# Patient Record
Sex: FEMALE | Race: WHITE | NOT HISPANIC OR LATINO | Employment: FULL TIME | URBAN - METROPOLITAN AREA
[De-identification: names, ages, dates, MRNs, and addresses within clinical notes are randomized per-mention and may not be internally consistent; named-entity substitution may affect disease eponyms.]

---

## 2023-05-26 ENCOUNTER — HOSPITAL ENCOUNTER (OUTPATIENT)
Dept: DATA CONVERSION | Facility: HOSPITAL | Age: 38
End: 2023-05-26
Attending: STUDENT IN AN ORGANIZED HEALTH CARE EDUCATION/TRAINING PROGRAM | Admitting: STUDENT IN AN ORGANIZED HEALTH CARE EDUCATION/TRAINING PROGRAM
Payer: COMMERCIAL

## 2023-05-26 DIAGNOSIS — J34.89 OTHER SPECIFIED DISORDERS OF NOSE AND NASAL SINUSES: ICD-10-CM

## 2023-05-26 DIAGNOSIS — F41.9 ANXIETY DISORDER, UNSPECIFIED: ICD-10-CM

## 2023-05-26 DIAGNOSIS — J34.2 DEVIATED NASAL SEPTUM: ICD-10-CM

## 2023-05-26 DIAGNOSIS — J34.3 HYPERTROPHY OF NASAL TURBINATES: ICD-10-CM

## 2023-05-26 DIAGNOSIS — J45.909 UNSPECIFIED ASTHMA, UNCOMPLICATED (HHS-HCC): ICD-10-CM

## 2023-05-26 DIAGNOSIS — F32.A DEPRESSION, UNSPECIFIED: ICD-10-CM

## 2023-05-26 DIAGNOSIS — F12.10 CANNABIS ABUSE, UNCOMPLICATED: ICD-10-CM

## 2023-05-26 DIAGNOSIS — G43.909 MIGRAINE, UNSPECIFIED, NOT INTRACTABLE, WITHOUT STATUS MIGRAINOSUS: ICD-10-CM

## 2023-05-26 DIAGNOSIS — I10 ESSENTIAL (PRIMARY) HYPERTENSION: ICD-10-CM

## 2023-05-26 DIAGNOSIS — J32.4 CHRONIC PANSINUSITIS: ICD-10-CM

## 2023-09-07 VITALS — HEIGHT: 64 IN | WEIGHT: 184.53 LBS | BODY MASS INDEX: 31.5 KG/M2

## 2023-09-30 NOTE — H&P
History & Physical Reviewed:   Pregnant/Lactating:  ·  Are You Pregnant no   ·  Are You Currently Breastfeeding no     I have reviewed the History and Physical dated:  23-May-2023   History and Physical reviewed and relevant findings noted. Patient examined to review pertinent physical  findings.: No significant changes   Home Medications Reviewed: no changes noted   Allergies Reviewed: no changes noted       ERAS (Enhanced Recovery After Surgery):  ·  ERAS Patient: no     Consent:   COVID-19 Consent:  ·  COVID-19 Risk Consent Surgeon has reviewed key risks related to the risk of nella COVID-19 and if they contract COVID-19 what the risks are.       Electronic Signatures:  Lukas Mcdowell)  (Signed 26-May-2023 07:29)   Authored: History & Physical Reviewed, ERAS, Consent,  Note Completion      Last Updated: 26-May-2023 07:29 by Lukas Mcdowell)

## 2023-10-02 NOTE — OP NOTE
Post Operative Note:     PreOp Diagnosis: nasal obstruction, nasal septal  deviation, inferior turbinate hypertrophy   Post-Procedure Diagnosis: same   Procedure: 1. nasal endoscopy  2. septoplasty  3. inferior turbinate reductions   Surgeon: Jeremias   Resident/Fellow/Other Assistant: none   Anesthesia: general   Estimated Blood Loss (mL): 25   Specimen: yes   Complications: none   Findings: see op note below   Patient Returned To/Condition: PACU/stable     Operative Report Dictated:  Dictation: not applicable - note contains Operative  Report   Operative Report:    Indications:   37-year-old female with longstanding issues with nasal airway obstruction and clinical findings of nasal septal deviation and inferior turbinate hypertrophy.  Patient has a history of aspirin intolerance and asthma but on imaging did not have significant  chronic sinusitis.  On initial nasal endoscopy there was generalized mucosal edema but no significant nasal drainage.  The patient was offered septoplasty and turbinate reduction with consideration of limited sinus surgery dependent on repeat nasal endoscopy  under anesthesia.  The risk, benefits, and alternatives were discussed in the office; see outpatient notes for further details.    Findings:  1.  On nasal endoscopy there is dry nasal mucosa anteriorly.  There is right nasal septal deviation with prominent spurring obstructing the middle meatus.  There is bilateral moderate inferior turbinate hypertrophy.  I do not appreciate significant drainage  from the left middle meatus nor patent posterior fontanelle.  No significant drainage was appreciated at the right middle meatus nor the bilateral sphenoid ethmoid recesses.  The nasopharynx was clear.  2.  Interestingly, part of the septal cartilage anteriorly was missing or had eroded.  No perforation was appreciated.  3.  Silicone stents were placed along the septum bilaterally and secured with a stitch.    Procedure:  The patient was  seen in the preoperative area where consent was confirmed and all questions were answered. The patient was brought back to the operating room where a full team timeout was performed. The patient was then induced and intubated by the anesthesia  team. The table was turned 90 degrees. [place] oxymetazoline was applied to both nasal cavities. The patient was placed in slight reverse Trendelenburg position.    The patient was prepped and draped in a sterile fashion and a second confirmatory timeout was performed.    We first evaluated the nasal cavities with a 0 degree endoscope bilaterally; the above findings were noted. We injected the septum and heads of the inferior turbinates with local 1% lidocaine with 1:100,000 epinephrine.    We continued with the septoplasty. After an appropriate amount of time a right-sided saroj-transfixion incision was incised using the 15-blade. The plane was first established with the Garrick elevator and a right-sided mucoperichondrial flap was elevated  posteriorly to the level of the choana. We planned out an L-strut taking care to ensure that at least 15 mm of cartilage was left anterior and superiorly for structural stability.  Interestingly, the patient did have a small area of eroded cartilage anterior  to our planned incision.  No perforation was present.  We used the Davison elevator to make our cartilage incisions. We traversed the cartilage and used the Garrick elevator to develop the contralateral mucoperichondrial flap. The quadrangular cartilage  was  from the bony septum and removed and saved for possible later implantation. The double-action punch was used to make our superior bony cut and the deviated bone was removed with the straight forceps. The flap was elevated posteriorly to  the left choana. All deviated bone was removed.    The mucoperichondrial pocket was irrigated to ensure that all cartilage and bone was removed and that hemostasis was achieved.  The  quadrangular cartilage was reshaped and crushed and then replaced between the mucoperichondrial pocket; this was fixed  with a quilting 4-0 Chromic Gut suture.  We closed the saroj-transfixion incision with 4-0 chromic gut suture.    We next proceeded with submucous resection of the inferior turbinates. Stab incisions were made bilaterally with a 15-blade. A medial pocket over the inferior turbinates was created with the Garrick elevator. The inferior turbinate blade on the microdebrider  was used to reduce the turbinates bilaterally.    Finally, silicone splints were placed and secured with a 3-0 prolene suture. An orogastric tube was passed into the stomach and the gastric contents were evacuated.    All instrumentation was then removed from the patient who was then turned back and returned to the care of the anesthesia team for emergence and extubation. The patient was transported to the recovery area. There were no immediate complications appreciated.    This note was created using speech recognition transcription software. Despite proofreading, typographical errors may be present that affect the meaning of the content. Please contact my office with any questions.        Attestation:   Note Completion:  Attending Attestation I performed the procedure without a resident         Electronic Signatures:  Lukas Mcdowell)  (Signed 26-May-2023 09:14)   Authored: Post Operative Note, Note Completion      Last Updated: 26-May-2023 09:14 by Lukas Mcdowell)

## 2023-11-10 RX ORDER — LISINOPRIL AND HYDROCHLOROTHIAZIDE 10; 12.5 MG/1; MG/1
TABLET ORAL
COMMUNITY
Start: 2016-05-24 | End: 2023-11-15 | Stop reason: SDUPTHER

## 2023-11-15 DIAGNOSIS — J45.909 ASTHMA, UNSPECIFIED ASTHMA SEVERITY, UNSPECIFIED WHETHER COMPLICATED, UNSPECIFIED WHETHER PERSISTENT (HHS-HCC): Primary | ICD-10-CM

## 2023-11-15 DIAGNOSIS — I10 ESSENTIAL (PRIMARY) HYPERTENSION: Primary | ICD-10-CM

## 2023-11-15 RX ORDER — LISINOPRIL AND HYDROCHLOROTHIAZIDE 10; 12.5 MG/1; MG/1
1 TABLET ORAL DAILY
Qty: 100 TABLET | Refills: 1 | Status: SHIPPED | OUTPATIENT
Start: 2023-11-15 | End: 2023-12-20 | Stop reason: SDUPTHER

## 2023-11-15 RX ORDER — METHYLPHENIDATE HYDROCHLORIDE 20 MG/1
TABLET ORAL
COMMUNITY
End: 2023-12-20 | Stop reason: WASHOUT

## 2023-11-15 RX ORDER — LOTEPREDNOL ETABONATE 5 MG/ML
SUSPENSION/ DROPS OPHTHALMIC
COMMUNITY
Start: 2023-10-28 | End: 2023-12-20 | Stop reason: WASHOUT

## 2023-11-15 RX ORDER — LISDEXAMFETAMINE DIMESYLATE 30 MG/1
CAPSULE ORAL
COMMUNITY
Start: 2023-08-03

## 2023-11-15 RX ORDER — ALBUTEROL SULFATE 1.25 MG/3ML
1.25 SOLUTION RESPIRATORY (INHALATION) EVERY 4 HOURS PRN
COMMUNITY
End: 2023-11-15 | Stop reason: SDUPTHER

## 2023-11-15 RX ORDER — OMALIZUMAB 150 MG/ML
INJECTION, SOLUTION SUBCUTANEOUS
COMMUNITY
Start: 2022-02-14

## 2023-11-15 RX ORDER — IPRATROPIUM BROMIDE 21 UG/1
SPRAY, METERED NASAL
COMMUNITY
Start: 2023-02-08 | End: 2023-12-20 | Stop reason: WASHOUT

## 2023-11-15 RX ORDER — OMALIZUMAB 75 MG/.5ML
INJECTION, SOLUTION SUBCUTANEOUS
COMMUNITY
Start: 2022-02-14

## 2023-11-15 RX ORDER — TOPIRAMATE 200 MG/1
TABLET ORAL
COMMUNITY

## 2023-11-15 RX ORDER — FLUTICASONE PROPIONATE AND SALMETEROL 50; 250 UG/1; UG/1
POWDER RESPIRATORY (INHALATION)
COMMUNITY
Start: 2022-03-31

## 2023-11-15 RX ORDER — PAROXETINE HYDROCHLORIDE HEMIHYDRATE 37.5 MG/1
TABLET, FILM COATED, EXTENDED RELEASE ORAL
COMMUNITY

## 2023-11-15 RX ORDER — ARIPIPRAZOLE 15 MG/1
TABLET ORAL
COMMUNITY
Start: 2023-10-15

## 2023-11-15 NOTE — TELEPHONE ENCOUNTER
Patient called today stating that she has been out of her Lisinopril for two weeks she stated that her BP has been in the 200's

## 2023-11-16 RX ORDER — ALBUTEROL SULFATE 1.25 MG/3ML
1.25 SOLUTION RESPIRATORY (INHALATION) EVERY 4 HOURS PRN
Qty: 75 ML | Refills: 1 | Status: SHIPPED | OUTPATIENT
Start: 2023-11-16

## 2023-11-16 RX ORDER — LISINOPRIL AND HYDROCHLOROTHIAZIDE 10; 12.5 MG/1; MG/1
TABLET ORAL
OUTPATIENT
Start: 2023-11-16

## 2023-12-20 ENCOUNTER — LAB (OUTPATIENT)
Dept: LAB | Facility: LAB | Age: 38
End: 2023-12-20
Payer: COMMERCIAL

## 2023-12-20 ENCOUNTER — OFFICE VISIT (OUTPATIENT)
Dept: PRIMARY CARE | Facility: CLINIC | Age: 38
End: 2023-12-20
Payer: COMMERCIAL

## 2023-12-20 ENCOUNTER — HOSPITAL ENCOUNTER (OUTPATIENT)
Dept: RADIOLOGY | Facility: EXTERNAL LOCATION | Age: 38
Discharge: HOME | End: 2023-12-20

## 2023-12-20 VITALS
BODY MASS INDEX: 31.58 KG/M2 | TEMPERATURE: 97.8 F | WEIGHT: 184 LBS | HEART RATE: 100 BPM | SYSTOLIC BLOOD PRESSURE: 118 MMHG | OXYGEN SATURATION: 98 % | DIASTOLIC BLOOD PRESSURE: 85 MMHG

## 2023-12-20 DIAGNOSIS — Z00.00 ANNUAL PHYSICAL EXAM: Primary | ICD-10-CM

## 2023-12-20 DIAGNOSIS — F33.1 MODERATE RECURRENT MAJOR DEPRESSION (MULTI): ICD-10-CM

## 2023-12-20 DIAGNOSIS — F90.2 ATTENTION DEFICIT HYPERACTIVITY DISORDER (ADHD), COMBINED TYPE: ICD-10-CM

## 2023-12-20 DIAGNOSIS — I10 ESSENTIAL (PRIMARY) HYPERTENSION: ICD-10-CM

## 2023-12-20 DIAGNOSIS — G47.33 OSA (OBSTRUCTIVE SLEEP APNEA): ICD-10-CM

## 2023-12-20 DIAGNOSIS — R73.9 HYPERGLYCEMIA: ICD-10-CM

## 2023-12-20 DIAGNOSIS — G47.19 EXCESSIVE DAYTIME SLEEPINESS: ICD-10-CM

## 2023-12-20 DIAGNOSIS — R53.83 OTHER FATIGUE: ICD-10-CM

## 2023-12-20 DIAGNOSIS — Z12.31 ENCOUNTER FOR SCREENING MAMMOGRAM FOR MALIGNANT NEOPLASM OF BREAST: ICD-10-CM

## 2023-12-20 DIAGNOSIS — E55.9 VITAMIN D DEFICIENCY: ICD-10-CM

## 2023-12-20 DIAGNOSIS — F41.9 ANXIETY: ICD-10-CM

## 2023-12-20 DIAGNOSIS — Z00.00 ANNUAL PHYSICAL EXAM: ICD-10-CM

## 2023-12-20 DIAGNOSIS — E78.2 MIXED HYPERLIPIDEMIA: ICD-10-CM

## 2023-12-20 PROBLEM — L30.9 ECZEMA: Status: ACTIVE | Noted: 2023-12-20

## 2023-12-20 PROBLEM — B97.7 HPV (HUMAN PAPILLOMA VIRUS) INFECTION: Status: ACTIVE | Noted: 2023-12-20

## 2023-12-20 PROBLEM — L70.0 ACNE VULGARIS: Status: ACTIVE | Noted: 2019-08-19

## 2023-12-20 PROBLEM — Z91.018 ALLERGY TO NUTS: Status: ACTIVE | Noted: 2022-03-21

## 2023-12-20 PROBLEM — J30.1 ALLERGIC RHINITIS DUE TO POLLEN: Status: ACTIVE | Noted: 2023-12-20

## 2023-12-20 PROBLEM — E78.5 HYPERLIPIDEMIA: Status: ACTIVE | Noted: 2023-12-20

## 2023-12-20 PROBLEM — Z15.89 HLA B27 POSITIVE: Status: ACTIVE | Noted: 2023-12-20

## 2023-12-20 PROBLEM — E66.01 MORBID OBESITY DUE TO EXCESS CALORIES (MULTI): Status: ACTIVE | Noted: 2023-12-20

## 2023-12-20 PROBLEM — E66.09 CLASS 1 OBESITY DUE TO EXCESS CALORIES WITH SERIOUS COMORBIDITY AND BODY MASS INDEX (BMI) OF 31.0 TO 31.9 IN ADULT: Status: ACTIVE | Noted: 2023-12-20

## 2023-12-20 PROBLEM — F41.1 ANXIETY STATE: Status: ACTIVE | Noted: 2022-03-21

## 2023-12-20 PROBLEM — J34.2 DEVIATED NASAL SEPTUM: Status: ACTIVE | Noted: 2023-12-20

## 2023-12-20 PROBLEM — E66.811 CLASS 1 OBESITY DUE TO EXCESS CALORIES WITH SERIOUS COMORBIDITY AND BODY MASS INDEX (BMI) OF 31.0 TO 31.9 IN ADULT: Status: ACTIVE | Noted: 2023-12-20

## 2023-12-20 PROBLEM — Z15.89 MTHFR MUTATION: Status: ACTIVE | Noted: 2023-12-20

## 2023-12-20 LAB
25(OH)D3 SERPL-MCNC: 27 NG/ML (ref 30–100)
ALBUMIN SERPL BCP-MCNC: 4.5 G/DL (ref 3.4–5)
ALP SERPL-CCNC: 43 U/L (ref 33–110)
ALT SERPL W P-5'-P-CCNC: 17 U/L (ref 7–45)
ANION GAP SERPL CALC-SCNC: 11 MMOL/L (ref 10–20)
APPEARANCE UR: CLEAR
AST SERPL W P-5'-P-CCNC: 16 U/L (ref 9–39)
BASOPHILS # BLD AUTO: 0.09 X10*3/UL (ref 0–0.1)
BASOPHILS NFR BLD AUTO: 1.2 %
BILIRUB SERPL-MCNC: 0.5 MG/DL (ref 0–1.2)
BILIRUB UR STRIP.AUTO-MCNC: NEGATIVE MG/DL
BUN SERPL-MCNC: 11 MG/DL (ref 6–23)
CALCIUM SERPL-MCNC: 9.5 MG/DL (ref 8.6–10.3)
CHLORIDE SERPL-SCNC: 106 MMOL/L (ref 98–107)
CHOLEST SERPL-MCNC: 254 MG/DL (ref 0–199)
CHOLESTEROL/HDL RATIO: 6
CO2 SERPL-SCNC: 24 MMOL/L (ref 21–32)
COLOR UR: YELLOW
CREAT SERPL-MCNC: 0.84 MG/DL (ref 0.5–1.05)
EOSINOPHIL # BLD AUTO: 0.65 X10*3/UL (ref 0–0.7)
EOSINOPHIL NFR BLD AUTO: 8.6 %
ERYTHROCYTE [DISTWIDTH] IN BLOOD BY AUTOMATED COUNT: 11.7 % (ref 11.5–14.5)
GFR SERPL CREATININE-BSD FRML MDRD: 90 ML/MIN/1.73M*2
GLUCOSE SERPL-MCNC: 80 MG/DL (ref 74–99)
GLUCOSE UR STRIP.AUTO-MCNC: NEGATIVE MG/DL
HCT VFR BLD AUTO: 39.3 % (ref 36–46)
HDLC SERPL-MCNC: 42.2 MG/DL
HGB BLD-MCNC: 12.9 G/DL (ref 12–16)
IMM GRANULOCYTES # BLD AUTO: 0.02 X10*3/UL (ref 0–0.7)
IMM GRANULOCYTES NFR BLD AUTO: 0.3 % (ref 0–0.9)
KETONES UR STRIP.AUTO-MCNC: NEGATIVE MG/DL
LDLC SERPL CALC-MCNC: 175 MG/DL
LEUKOCYTE ESTERASE UR QL STRIP.AUTO: NEGATIVE
LYMPHOCYTES # BLD AUTO: 2.25 X10*3/UL (ref 1.2–4.8)
LYMPHOCYTES NFR BLD AUTO: 29.6 %
MCH RBC QN AUTO: 29.5 PG (ref 26–34)
MCHC RBC AUTO-ENTMCNC: 32.8 G/DL (ref 32–36)
MCV RBC AUTO: 90 FL (ref 80–100)
MONOCYTES # BLD AUTO: 0.58 X10*3/UL (ref 0.1–1)
MONOCYTES NFR BLD AUTO: 7.6 %
NEUTROPHILS # BLD AUTO: 4.01 X10*3/UL (ref 1.2–7.7)
NEUTROPHILS NFR BLD AUTO: 52.7 %
NITRITE UR QL STRIP.AUTO: NEGATIVE
NON HDL CHOLESTEROL: 212 MG/DL (ref 0–149)
NRBC BLD-RTO: 0 /100 WBCS (ref 0–0)
PH UR STRIP.AUTO: 7 [PH]
PLATELET # BLD AUTO: 361 X10*3/UL (ref 150–450)
POTASSIUM SERPL-SCNC: 3.7 MMOL/L (ref 3.5–5.3)
PROT SERPL-MCNC: 6.9 G/DL (ref 6.4–8.2)
PROT UR STRIP.AUTO-MCNC: NEGATIVE MG/DL
RBC # BLD AUTO: 4.38 X10*6/UL (ref 4–5.2)
RBC # UR STRIP.AUTO: NEGATIVE /UL
SODIUM SERPL-SCNC: 137 MMOL/L (ref 136–145)
SP GR UR STRIP.AUTO: 1.01
TRIGL SERPL-MCNC: 182 MG/DL (ref 0–149)
TSH SERPL-ACNC: 1.43 MIU/L (ref 0.44–3.98)
UROBILINOGEN UR STRIP.AUTO-MCNC: <2 MG/DL
VLDL: 36 MG/DL (ref 0–40)
WBC # BLD AUTO: 7.6 X10*3/UL (ref 4.4–11.3)

## 2023-12-20 PROCEDURE — 99214 OFFICE O/P EST MOD 30 MIN: CPT | Performed by: INTERNAL MEDICINE

## 2023-12-20 PROCEDURE — 3074F SYST BP LT 130 MM HG: CPT | Performed by: INTERNAL MEDICINE

## 2023-12-20 PROCEDURE — 99396 PREV VISIT EST AGE 40-64: CPT | Performed by: INTERNAL MEDICINE

## 2023-12-20 PROCEDURE — 80053 COMPREHEN METABOLIC PANEL: CPT

## 2023-12-20 PROCEDURE — 84443 ASSAY THYROID STIM HORMONE: CPT

## 2023-12-20 PROCEDURE — 83036 HEMOGLOBIN GLYCOSYLATED A1C: CPT

## 2023-12-20 PROCEDURE — 80061 LIPID PANEL: CPT

## 2023-12-20 PROCEDURE — 85025 COMPLETE CBC W/AUTO DIFF WBC: CPT

## 2023-12-20 PROCEDURE — 82306 VITAMIN D 25 HYDROXY: CPT

## 2023-12-20 PROCEDURE — 3008F BODY MASS INDEX DOCD: CPT | Performed by: INTERNAL MEDICINE

## 2023-12-20 PROCEDURE — 1036F TOBACCO NON-USER: CPT | Performed by: INTERNAL MEDICINE

## 2023-12-20 PROCEDURE — 36415 COLL VENOUS BLD VENIPUNCTURE: CPT

## 2023-12-20 PROCEDURE — 3079F DIAST BP 80-89 MM HG: CPT | Performed by: INTERNAL MEDICINE

## 2023-12-20 PROCEDURE — 81003 URINALYSIS AUTO W/O SCOPE: CPT

## 2023-12-20 RX ORDER — LISINOPRIL 10 MG/1
10 TABLET ORAL DAILY
COMMUNITY
End: 2023-12-20 | Stop reason: WASHOUT

## 2023-12-20 RX ORDER — LISINOPRIL AND HYDROCHLOROTHIAZIDE 10; 12.5 MG/1; MG/1
1 TABLET ORAL DAILY
Qty: 100 TABLET | Refills: 1 | Status: SHIPPED | OUTPATIENT
Start: 2023-12-20 | End: 2024-07-07

## 2023-12-20 RX ORDER — FAMOTIDINE 20 MG/1
20 TABLET, FILM COATED ORAL DAILY
COMMUNITY

## 2023-12-20 RX ORDER — FEXOFENADINE HCL 60 MG
60 TABLET ORAL DAILY
COMMUNITY

## 2023-12-20 RX ORDER — BENZOCAINE .13; .15; .5; 2 G/100G; G/100G; G/100G; G/100G
2 GEL ORAL DAILY
COMMUNITY
Start: 2014-09-24

## 2023-12-20 NOTE — PROGRESS NOTES
Primary Care Physician: Jens Mendez MD    Date of Visit: 12/20/2023     Chief Complaint:   Chief Complaint   Patient presents with    Follow-up     6 mo        Subjective:  Eloy Taylor is a 38 y.o. female presents annual physical      Past Medical History:  Past Medical History:   Diagnosis Date    Encounter for fertility testing 01/14/2018    Fertility testing    Encounter for other general counseling and advice on procreation 10/26/2017    Encounter for preconception consultation    Headache, unspecified     Bad headache    Maternal care for (suspected) hereditary disease in fetus, not applicable or unspecified 10/26/2017    Hereditary disease in family possibly affecting fetus    Other allergy status, other than to drugs and biological substances     History of environmental allergies    Other specified health status 08/31/2018    Contraception    Other specified health status 08/31/2018    Contraception    Other specified postprocedural states 01/19/2018    History of in vitro fertilization    Pain in unspecified knee 02/21/2019    Chronic knee pain    Pain in unspecified shoulder 12/22/2016    Acromioclavicular pain    Personal history of other diseases of the circulatory system 12/28/2015    History of essential hypertension    Personal history of other diseases of the circulatory system     History of hypertension    Personal history of other diseases of the nervous system and sense organs 05/16/2017    History of migraine    Personal history of other diseases of the respiratory system     History of asthma    Personal history of other mental and behavioral disorders 10/25/2017    History of anxiety disorder    Recurrent pregnancy loss 10/09/2017    Recurrent pregnancy loss without current pregnancy        Social History:   reports that she has never smoked. She has never used smokeless tobacco. She reports that she does not currently use drugs.     Surgical History:  Past Surgical History:   Procedure  "Laterality Date    DILATION AND CURETTAGE OF UTERUS  06/29/2015    Dilation And Curettage    MOUTH SURGERY  12/07/2015    Oral Surgery Tooth Extraction    OTHER SURGICAL HISTORY  01/04/2023    Elbow surgery        Family History:  family history is not on file.      Allergies:  Allergies   Allergen Reactions    Aspirin Shortness of breath     Worsens her asthma    Doxylamin-Pse-Dm-Acetaminophen Anaphylaxis    Latex Itching    Nut - Unspecified Unknown     tree nuts    Animal Dander Unknown    Azithromycin Nausea/vomiting    Codeine Nausea And Vomiting and Nausea/vomiting    Hydrocodone-Acetaminophen GI Upset    Milk Rash    Sertraline Itching and Rash    Sumatriptan Unknown     Other reaction(s): Other: See Comments   Pt gets all side effect listed with medication    Pt gets all side effect listed with medication    Tramadol Dizziness     Other reaction(s): Other: See Comments   \"Drunk\" feeling    \"Drunk\" feeling       Outpatient Medications:  Current Outpatient Medications   Medication Instructions    Advair Diskus 250-50 mcg/dose diskus inhaler     albuterol 1.25 mg, nebulization, Every 4 hours PRN    ARIPiprazole (Abilify) 15 mg tablet     budesonide (Rhinocort Allergy) 32 mcg/actuation nasal spray 2 sprays, nasal, Daily    famotidine (PEPCID) 20 mg, oral, Daily    fexofenadine (ALLEGRA ALLERGY) 60 mg, oral, Daily    lisinopriL-hydrochlorothiazide 10-12.5 mg tablet 1 tablet, oral, Daily    PARoxetine CR (Paxil-CR) 37.5 mg 24 hr tablet oral    topiramate (Topamax) 200 mg tablet oral    Vyvanse 30 mg capsule     Xolair 150 mg/mL injection     Xolair 75 mg/0.5 mL injection        HPI:  HPI  Hx of htn--taking medication daily.   Hx of adhd/depression and anxiety==followed by psyche.    Anxiety.  States that therapist thinks that she may be on the autism spectrum.   Hx of rey.  Had sleep study, but has not been able to sched cpap titration. Will need a new order.   ROS:   Review of Systems   Constitutional:  Negative " for activity change, chills, fatigue, fever and unexpected weight change.   HENT:  Negative for congestion, dental problem, ear pain, sinus pressure, sinus pain, sore throat and trouble swallowing.    Eyes:  Negative for photophobia, discharge, redness and visual disturbance.   Respiratory:  Negative for cough, chest tightness, shortness of breath and wheezing.    Cardiovascular:  Negative for chest pain, palpitations and leg swelling.   Gastrointestinal:  Negative for abdominal pain, blood in stool, constipation, diarrhea, nausea and vomiting.   Endocrine: Negative for cold intolerance, heat intolerance, polydipsia, polyphagia and polyuria.   Genitourinary:  Negative for difficulty urinating, dysuria, flank pain, frequency and urgency.   Musculoskeletal:  Negative for arthralgias, joint swelling and myalgias.   Skin:  Negative for color change and rash.   Allergic/Immunologic: Negative for environmental allergies, food allergies and immunocompromised state.   Neurological:  Negative for dizziness, weakness, light-headedness, numbness and headaches.   Hematological:  Does not bruise/bleed easily.   Psychiatric/Behavioral:  Positive for sleep disturbance. Negative for dysphoric mood. The patient is not nervous/anxious.         No anxiety.  No depression  +rey        Vitals:  /85   Pulse 100   Temp 36.6 °C (97.8 °F)   Wt 83.5 kg (184 lb)   SpO2 98%   BMI 31.58 kg/m²   Wt Readings from Last 2 Encounters:   12/20/23 83.5 kg (184 lb)   06/01/23 83.1 kg (183 lb 2 oz)       Physical Exam:  Physical Exam  Constitutional:       General: She is not in acute distress.     Appearance: Normal appearance. She is well-developed and well-groomed.   HENT:      Head: Normocephalic and atraumatic.      Right Ear: Tympanic membrane and ear canal normal.      Left Ear: Tympanic membrane and ear canal normal.      Nose: Nose normal.      Mouth/Throat:      Mouth: Mucous membranes are moist.      Pharynx: Oropharynx is clear.    Eyes:      Conjunctiva/sclera: Conjunctivae normal.      Pupils: Pupils are equal, round, and reactive to light.   Neck:      Thyroid: No thyromegaly.   Cardiovascular:      Rate and Rhythm: Normal rate and regular rhythm.      Heart sounds: Normal heart sounds, S1 normal and S2 normal. No murmur heard.  Pulmonary:      Effort: Pulmonary effort is normal.      Breath sounds: Normal breath sounds and air entry. No wheezing, rhonchi or rales.   Abdominal:      General: Bowel sounds are normal. There is no distension.      Palpations: Abdomen is soft.      Tenderness: There is no abdominal tenderness. There is no guarding or rebound.   Musculoskeletal:         General: No swelling or tenderness. Normal range of motion.      Cervical back: Normal, full passive range of motion without pain, normal range of motion and neck supple.      Thoracic back: Normal.      Lumbar back: Normal.      Right lower leg: No edema.      Left lower leg: No edema.      Comments: Upper and lower extrems are wnl--inspection and palpation.   Strength is normal and symmetric.   Lymphadenopathy:      Cervical: No cervical adenopathy.   Skin:     General: Skin is warm and dry.      Findings: No bruising, erythema, lesion or rash.      Comments: Intact   Neurological:      General: No focal deficit present.      Mental Status: She is alert and oriented to person, place, and time.      Deep Tendon Reflexes: Reflexes are normal and symmetric.   Psychiatric:         Mood and Affect: Mood and affect normal.        Assessment/Plan   Diagnoses and all orders for this visit:  Annual physical exam  -     CBC and Auto Differential; Future  -     Comprehensive Metabolic Panel; Future  -     Lipid Panel; Future  -     Urinalysis with Reflex Microscopic  -     Vitamin D 25-Hydroxy,Total (for eval of Vitamin D levels); Future  -     TSH with reflex to Free T4 if abnormal; Future  Essential (primary) hypertension  -     lisinopriL-hydrochlorothiazide 10-12.5  mg tablet; Take 1 tablet by mouth once daily.  NICOLE (obstructive sleep apnea)  -     In-Center Sleep Study; Future  Attention deficit hyperactivity disorder (ADHD), combined type  Vitamin D deficiency  Mixed hyperlipidemia  Anxiety  Moderate recurrent major depression (CMS/HCC)  Encounter for screening mammogram for malignant neoplasm of breast  -     BI mammo bilateral screening tomosynthesis; Future  Hyperglycemia  -     Hemoglobin A1C; Future  Other fatigue  Excessive daytime sleepiness          Orders:  Orders Placed This Encounter   Procedures    BI mammo bilateral screening tomosynthesis    CBC and Auto Differential    Comprehensive Metabolic Panel    Lipid Panel    Urinalysis with Reflex Microscopic    Vitamin D 25-Hydroxy,Total (for eval of Vitamin D levels)    TSH with reflex to Free T4 if abnormal    Hemoglobin A1C    In-Center Sleep Study      Followup Appts:  No future appointments.        ____________________________________________________________  Jens Mendez MD

## 2023-12-21 LAB
EST. AVERAGE GLUCOSE BLD GHB EST-MCNC: 111 MG/DL
HBA1C MFR BLD: 5.5 %

## 2023-12-27 ENCOUNTER — TELEPHONE (OUTPATIENT)
Dept: PRIMARY CARE | Facility: CLINIC | Age: 38
End: 2023-12-27
Payer: COMMERCIAL

## 2023-12-27 NOTE — TELEPHONE ENCOUNTER
[facility] SLEEP LAB CALLING NEEDING YOU TO ADDEND YOUR OFFICE VISIT NOTE IT DOES NOT STATE WHY PATIENT NEEDS SLEEP STUDY

## 2023-12-29 ASSESSMENT — ENCOUNTER SYMPTOMS
NAUSEA: 0
SINUS PRESSURE: 0
VOMITING: 0
ACTIVITY CHANGE: 0
BRUISES/BLEEDS EASILY: 0
BLOOD IN STOOL: 0
DYSPHORIC MOOD: 0
SORE THROAT: 0
PALPITATIONS: 0
FATIGUE: 0
EYE REDNESS: 0
POLYPHAGIA: 0
DIZZINESS: 0
SLEEP DISTURBANCE: 1
WEAKNESS: 0
JOINT SWELLING: 0
MYALGIAS: 0
DIFFICULTY URINATING: 0
LIGHT-HEADEDNESS: 0
UNEXPECTED WEIGHT CHANGE: 0
COLOR CHANGE: 0
POLYDIPSIA: 0
HEADACHES: 0
SINUS PAIN: 0
WHEEZING: 0
DIARRHEA: 0
CONSTIPATION: 0
FEVER: 0
FLANK PAIN: 0
EYE DISCHARGE: 0
ABDOMINAL PAIN: 0
CHILLS: 0
NERVOUS/ANXIOUS: 0
CHEST TIGHTNESS: 0
TROUBLE SWALLOWING: 0
COUGH: 0
FREQUENCY: 0
ARTHRALGIAS: 0
PHOTOPHOBIA: 0
NUMBNESS: 0
SHORTNESS OF BREATH: 0
DYSURIA: 0

## 2024-06-25 PROBLEM — I10 BENIGN ESSENTIAL HYPERTENSION: Status: RESOLVED | Noted: 2022-03-21 | Resolved: 2024-06-25

## 2024-06-25 PROBLEM — E78.5 HYPERLIPIDEMIA: Status: RESOLVED | Noted: 2023-12-20 | Resolved: 2024-06-25

## 2024-06-25 PROBLEM — J45.909 ASTHMA (HHS-HCC): Status: ACTIVE | Noted: 2024-06-25

## 2024-06-25 NOTE — PATIENT INSTRUCTIONS
Continue Xolair 375 mg through [facility] until Dupixent is started here in the office.  This will be delivered to your home from a specialty pharmacy.  We will contact you to confirm delivery once we obtain authorization.  If you do not hear from them in the next two weeks, please contact our office.      Continue Advair.     Continue albuterol as needed.    Follow up in 5 months or sooner if problems or symptoms worsen prior.

## 2024-06-25 NOTE — PROGRESS NOTES
Subjective   Patient ID:   [id number]   Eloy Taylor is a 38 y.o. female who presents for Allergies and Asthma (ACT 25/XOLAIR 375 EVERY 3 WEEKS (spread out) interested in switching to Dupixent to help with eczema).    Chief Complaint   Patient presents with    Allergies    Asthma     ACT 25  XOLAIR 375 EVERY 3 WEEKS (spread out) interested in switching to Dupixent to help with eczema     Since last visit, 8-3-23, patient reports she receives Xolair every 3 weeks for her asthma and her asthma is under excellent control.     Nonetheless, she is currently seeing dermatology and is requiring but she is C/O eczema flaring on her arms, face and scalp despite using halobetasol, tacrolimus and Kenalog cream.  In addition, she is developing acne due to the creams. She saw [facility] and was recommended switching to Dupixent.      She continues Advair once a day.     Objective     /62 (BP Location: Left arm, Patient Position: Sitting, BP Cuff Size: Adult)   Pulse 94   SpO2 100%      Physical Exam  Constitutional:       Appearance: Normal appearance.   HENT:      Head: Normocephalic and atraumatic.      Right Ear: External ear normal. There is no impacted cerumen.      Left Ear: External ear normal. There is no impacted cerumen.      Nose: No congestion or rhinorrhea.   Eyes:      Extraocular Movements: Extraocular movements intact.      Conjunctiva/sclera: Conjunctivae normal.      Pupils: Pupils are equal, round, and reactive to light.   Cardiovascular:      Rate and Rhythm: Normal rate and regular rhythm.      Heart sounds: No murmur heard.     No friction rub. No gallop.   Pulmonary:      Effort: No respiratory distress.      Breath sounds: No wheezing, rhonchi or rales.   Skin:     General: Skin is warm and dry.      Findings: Rash (dermatitis on her face) present.   Neurological:      Mental Status: She is alert.   Psychiatric:         Mood and Affect: Mood normal.         Behavior: Behavior normal.        Assessment/Plan     Asthma ([facility])  ACT is 25.    She is doing very well with Xolair, but now her medical history is complicated by dermatitis. Her last eosinophils were measured at over than 600. I would like to change her Xolair to Dupixent. Hopefully she will have as good of a response to the Dupixent    Atopic dermatitis  I would like her to start Dupixent. She has failed multiple medications and with her co-existing asthma Dupixent is a better option for her than Xolair.     Approximately 24% of her body is affected. But most importantly is that her facial dermatitis is not controlled despite medications and she is having side effects from the medications          By signing my name below, I, Corie Bunch, attest that this documentation has been prepared under the direction and in the presence of Mayda Beard MD.  All medical record entries made by the Scribe were at my direction and personally dictated by me. I have reviewed the chart and agree that the record accurately reflects my personal performance of the history, physical exam, discussion and plan.

## 2024-06-25 NOTE — ASSESSMENT & PLAN NOTE
ACT is 25.    She is doing very well with Xolair, but now her medical history is complicated by dermatitis. Her last eosinophils were measured at over than 600. I would like to change her Xolair to Dupixent. Hopefully she will have as good of a response to the Dupixent

## 2024-06-26 ENCOUNTER — APPOINTMENT (OUTPATIENT)
Dept: ALLERGY | Facility: CLINIC | Age: 39
End: 2024-06-26
Payer: COMMERCIAL

## 2024-06-26 VITALS — HEART RATE: 94 BPM | OXYGEN SATURATION: 100 % | DIASTOLIC BLOOD PRESSURE: 62 MMHG | SYSTOLIC BLOOD PRESSURE: 116 MMHG

## 2024-06-26 DIAGNOSIS — J30.1 ALLERGIC RHINITIS DUE TO POLLEN, UNSPECIFIED SEASONALITY: Primary | ICD-10-CM

## 2024-06-26 DIAGNOSIS — L20.89 OTHER ATOPIC DERMATITIS: ICD-10-CM

## 2024-06-26 DIAGNOSIS — J45.909 ASTHMA, UNSPECIFIED ASTHMA SEVERITY, UNSPECIFIED WHETHER COMPLICATED, UNSPECIFIED WHETHER PERSISTENT (HHS-HCC): ICD-10-CM

## 2024-06-26 PROBLEM — L20.9 ATOPIC DERMATITIS: Status: ACTIVE | Noted: 2023-12-20

## 2024-06-26 PROCEDURE — 3078F DIAST BP <80 MM HG: CPT | Performed by: ALLERGY & IMMUNOLOGY

## 2024-06-26 PROCEDURE — 99214 OFFICE O/P EST MOD 30 MIN: CPT | Performed by: ALLERGY & IMMUNOLOGY

## 2024-06-26 PROCEDURE — 96160 PT-FOCUSED HLTH RISK ASSMT: CPT | Performed by: ALLERGY & IMMUNOLOGY

## 2024-06-26 PROCEDURE — 3074F SYST BP LT 130 MM HG: CPT | Performed by: ALLERGY & IMMUNOLOGY

## 2024-06-26 PROCEDURE — 1036F TOBACCO NON-USER: CPT | Performed by: ALLERGY & IMMUNOLOGY

## 2024-06-26 RX ORDER — ADAPALENE GEL USP, 0.3% 3 MG/G
GEL TOPICAL
COMMUNITY
Start: 2024-06-24

## 2024-06-26 RX ORDER — TRIAMCINOLONE ACETONIDE 1 MG/G
CREAM TOPICAL
COMMUNITY
Start: 2024-05-03

## 2024-06-26 RX ORDER — ALBUTEROL SULFATE 90 UG/1
2 AEROSOL, METERED RESPIRATORY (INHALATION) AS NEEDED
COMMUNITY

## 2024-06-26 RX ORDER — TRIAMCINOLONE ACETONIDE 55 UG/1
2 SPRAY, METERED NASAL DAILY
COMMUNITY

## 2024-06-26 RX ORDER — CETIRIZINE HYDROCHLORIDE 10 MG/1
20 TABLET ORAL DAILY
COMMUNITY

## 2024-06-26 NOTE — ASSESSMENT & PLAN NOTE
I would like her to start Dupixent. She has failed multiple medications and with her co-existing asthma Dupixent is a better option for her than Xolair.     Approximately 24% of her body is affected. But most importantly is that her facial dermatitis is not controlled despite medications and she is having side effects from the medications

## 2024-08-14 DIAGNOSIS — I10 ESSENTIAL (PRIMARY) HYPERTENSION: ICD-10-CM

## 2024-08-15 RX ORDER — LISINOPRIL AND HYDROCHLOROTHIAZIDE 10; 12.5 MG/1; MG/1
1 TABLET ORAL DAILY
Qty: 100 TABLET | Refills: 0 | Status: SHIPPED | OUTPATIENT
Start: 2024-08-15

## 2024-09-23 DIAGNOSIS — J45.909 ASTHMA, UNSPECIFIED ASTHMA SEVERITY, UNSPECIFIED WHETHER COMPLICATED, UNSPECIFIED WHETHER PERSISTENT (HHS-HCC): Primary | ICD-10-CM

## 2024-09-24 RX ORDER — FLUTICASONE PROPIONATE AND SALMETEROL 50; 250 UG/1; UG/1
POWDER RESPIRATORY (INHALATION)
Qty: 60 EACH | Refills: 3 | Status: SHIPPED | OUTPATIENT
Start: 2024-09-24

## 2024-10-15 DIAGNOSIS — J45.909 ASTHMA, UNSPECIFIED ASTHMA SEVERITY, UNSPECIFIED WHETHER COMPLICATED, UNSPECIFIED WHETHER PERSISTENT (HHS-HCC): Primary | ICD-10-CM

## 2024-10-17 RX ORDER — ALBUTEROL SULFATE 90 UG/1
INHALANT RESPIRATORY (INHALATION)
Qty: 8.5 G | Refills: 0 | Status: SHIPPED | OUTPATIENT
Start: 2024-10-17

## 2024-11-29 DIAGNOSIS — I10 ESSENTIAL (PRIMARY) HYPERTENSION: ICD-10-CM

## 2024-11-29 DIAGNOSIS — J45.909 ASTHMA, UNSPECIFIED ASTHMA SEVERITY, UNSPECIFIED WHETHER COMPLICATED, UNSPECIFIED WHETHER PERSISTENT (HHS-HCC): ICD-10-CM

## 2024-12-02 ASSESSMENT — PROMIS GLOBAL HEALTH SCALE
RATE_GENERAL_HEALTH: VERY GOOD
CARRYOUT_PHYSICAL_ACTIVITIES: COMPLETELY
RATE_MENTAL_HEALTH: GOOD
RATE_AVERAGE_FATIGUE: MODERATE
RATE_AVERAGE_PAIN: 0
EMOTIONAL_PROBLEMS: SOMETIMES
RATE_QUALITY_OF_LIFE: VERY GOOD
RATE_PHYSICAL_HEALTH: GOOD
CARRYOUT_SOCIAL_ACTIVITIES: VERY GOOD
RATE_SOCIAL_SATISFACTION: VERY GOOD

## 2024-12-03 RX ORDER — ALBUTEROL SULFATE 90 UG/1
INHALANT RESPIRATORY (INHALATION)
Qty: 17 G | Refills: 0 | Status: SHIPPED | OUTPATIENT
Start: 2024-12-03

## 2024-12-06 ENCOUNTER — LAB (OUTPATIENT)
Dept: LAB | Facility: LAB | Age: 39
End: 2024-12-06
Payer: COMMERCIAL

## 2024-12-06 ENCOUNTER — APPOINTMENT (OUTPATIENT)
Dept: PRIMARY CARE | Facility: CLINIC | Age: 39
End: 2024-12-06
Payer: COMMERCIAL

## 2024-12-06 VITALS
TEMPERATURE: 97.3 F | RESPIRATION RATE: 16 BRPM | BODY MASS INDEX: 34.33 KG/M2 | WEIGHT: 200 LBS | HEART RATE: 88 BPM | DIASTOLIC BLOOD PRESSURE: 80 MMHG | SYSTOLIC BLOOD PRESSURE: 125 MMHG

## 2024-12-06 DIAGNOSIS — F90.9 ATTENTION DEFICIT HYPERACTIVITY DISORDER (ADHD), UNSPECIFIED ADHD TYPE: ICD-10-CM

## 2024-12-06 DIAGNOSIS — Z00.00 ANNUAL PHYSICAL EXAM: ICD-10-CM

## 2024-12-06 DIAGNOSIS — F41.9 ANXIETY: ICD-10-CM

## 2024-12-06 DIAGNOSIS — E78.2 MIXED HYPERLIPIDEMIA: ICD-10-CM

## 2024-12-06 DIAGNOSIS — J45.909 ASTHMA, UNSPECIFIED ASTHMA SEVERITY, UNSPECIFIED WHETHER COMPLICATED, UNSPECIFIED WHETHER PERSISTENT (HHS-HCC): ICD-10-CM

## 2024-12-06 DIAGNOSIS — Z00.00 ANNUAL PHYSICAL EXAM: Primary | ICD-10-CM

## 2024-12-06 DIAGNOSIS — Z12.31 ENCOUNTER FOR SCREENING MAMMOGRAM FOR MALIGNANT NEOPLASM OF BREAST: ICD-10-CM

## 2024-12-06 DIAGNOSIS — E66.3 OVERWEIGHT: ICD-10-CM

## 2024-12-06 DIAGNOSIS — E66.01 MORBID OBESITY (MULTI): ICD-10-CM

## 2024-12-06 DIAGNOSIS — E66.01 MORBID (SEVERE) OBESITY DUE TO EXCESS CALORIES (MULTI): ICD-10-CM

## 2024-12-06 LAB
25(OH)D3 SERPL-MCNC: 21 NG/ML (ref 30–100)
ALBUMIN SERPL BCP-MCNC: 4.5 G/DL (ref 3.4–5)
ALP SERPL-CCNC: 49 U/L (ref 33–110)
ALT SERPL W P-5'-P-CCNC: 29 U/L (ref 7–45)
ANION GAP SERPL CALC-SCNC: 13 MMOL/L (ref 10–20)
APPEARANCE UR: CLEAR
AST SERPL W P-5'-P-CCNC: 20 U/L (ref 9–39)
BASOPHILS # BLD AUTO: 0.07 X10*3/UL (ref 0–0.1)
BASOPHILS NFR BLD AUTO: 0.8 %
BILIRUB SERPL-MCNC: 0.4 MG/DL (ref 0–1.2)
BILIRUB UR STRIP.AUTO-MCNC: NEGATIVE MG/DL
BUN SERPL-MCNC: 12 MG/DL (ref 6–23)
CALCIUM SERPL-MCNC: 9.5 MG/DL (ref 8.6–10.3)
CHLORIDE SERPL-SCNC: 103 MMOL/L (ref 98–107)
CHOLEST SERPL-MCNC: 240 MG/DL (ref 0–199)
CHOLESTEROL/HDL RATIO: 5
CO2 SERPL-SCNC: 28 MMOL/L (ref 21–32)
COLOR UR: NORMAL
CREAT SERPL-MCNC: 1.07 MG/DL (ref 0.5–1.05)
EGFRCR SERPLBLD CKD-EPI 2021: 67 ML/MIN/1.73M*2
EOSINOPHIL # BLD AUTO: 1.25 X10*3/UL (ref 0–0.7)
EOSINOPHIL NFR BLD AUTO: 13.9 %
ERYTHROCYTE [DISTWIDTH] IN BLOOD BY AUTOMATED COUNT: 11.8 % (ref 11.5–14.5)
EST. AVERAGE GLUCOSE BLD GHB EST-MCNC: 105 MG/DL
GLUCOSE SERPL-MCNC: 85 MG/DL (ref 74–99)
GLUCOSE UR STRIP.AUTO-MCNC: NORMAL MG/DL
HBA1C MFR BLD: 5.3 %
HCT VFR BLD AUTO: 39.9 % (ref 36–46)
HDLC SERPL-MCNC: 47.9 MG/DL
HGB BLD-MCNC: 13.5 G/DL (ref 12–16)
IMM GRANULOCYTES # BLD AUTO: 0.03 X10*3/UL (ref 0–0.7)
IMM GRANULOCYTES NFR BLD AUTO: 0.3 % (ref 0–0.9)
KETONES UR STRIP.AUTO-MCNC: NEGATIVE MG/DL
LDLC SERPL CALC-MCNC: 117 MG/DL
LEUKOCYTE ESTERASE UR QL STRIP.AUTO: NEGATIVE
LYMPHOCYTES # BLD AUTO: 2.59 X10*3/UL (ref 1.2–4.8)
LYMPHOCYTES NFR BLD AUTO: 28.7 %
MCH RBC QN AUTO: 30.1 PG (ref 26–34)
MCHC RBC AUTO-ENTMCNC: 33.8 G/DL (ref 32–36)
MCV RBC AUTO: 89 FL (ref 80–100)
MONOCYTES # BLD AUTO: 0.52 X10*3/UL (ref 0.1–1)
MONOCYTES NFR BLD AUTO: 5.8 %
NEUTROPHILS # BLD AUTO: 4.55 X10*3/UL (ref 1.2–7.7)
NEUTROPHILS NFR BLD AUTO: 50.5 %
NITRITE UR QL STRIP.AUTO: NEGATIVE
NON HDL CHOLESTEROL: 192 MG/DL (ref 0–149)
NRBC BLD-RTO: 0 /100 WBCS (ref 0–0)
PH UR STRIP.AUTO: 6 [PH]
PLATELET # BLD AUTO: 355 X10*3/UL (ref 150–450)
POTASSIUM SERPL-SCNC: 4.2 MMOL/L (ref 3.5–5.3)
PROT SERPL-MCNC: 6.9 G/DL (ref 6.4–8.2)
PROT UR STRIP.AUTO-MCNC: NEGATIVE MG/DL
RBC # BLD AUTO: 4.49 X10*6/UL (ref 4–5.2)
RBC # UR STRIP.AUTO: NEGATIVE /UL
SODIUM SERPL-SCNC: 140 MMOL/L (ref 136–145)
SP GR UR STRIP.AUTO: 1.01
TRIGL SERPL-MCNC: 375 MG/DL (ref 0–149)
TSH SERPL-ACNC: 1.42 MIU/L (ref 0.44–3.98)
UROBILINOGEN UR STRIP.AUTO-MCNC: NORMAL MG/DL
VLDL: 75 MG/DL (ref 0–40)
WBC # BLD AUTO: 9 X10*3/UL (ref 4.4–11.3)

## 2024-12-06 PROCEDURE — 80061 LIPID PANEL: CPT

## 2024-12-06 PROCEDURE — 3079F DIAST BP 80-89 MM HG: CPT | Performed by: INTERNAL MEDICINE

## 2024-12-06 PROCEDURE — 84443 ASSAY THYROID STIM HORMONE: CPT

## 2024-12-06 PROCEDURE — 80053 COMPREHEN METABOLIC PANEL: CPT

## 2024-12-06 PROCEDURE — 99214 OFFICE O/P EST MOD 30 MIN: CPT | Performed by: INTERNAL MEDICINE

## 2024-12-06 PROCEDURE — 82306 VITAMIN D 25 HYDROXY: CPT

## 2024-12-06 PROCEDURE — 36415 COLL VENOUS BLD VENIPUNCTURE: CPT

## 2024-12-06 PROCEDURE — 81003 URINALYSIS AUTO W/O SCOPE: CPT

## 2024-12-06 PROCEDURE — 3074F SYST BP LT 130 MM HG: CPT | Performed by: INTERNAL MEDICINE

## 2024-12-06 PROCEDURE — 83036 HEMOGLOBIN GLYCOSYLATED A1C: CPT

## 2024-12-06 PROCEDURE — 85025 COMPLETE CBC W/AUTO DIFF WBC: CPT

## 2024-12-06 PROCEDURE — 99396 PREV VISIT EST AGE 40-64: CPT | Performed by: INTERNAL MEDICINE

## 2024-12-06 RX ORDER — PHENTERMINE HYDROCHLORIDE 37.5 MG/1
37.5 TABLET ORAL DAILY
Qty: 30 TABLET | Refills: 0 | Status: SHIPPED | OUTPATIENT
Start: 2024-12-06

## 2024-12-06 RX ORDER — PAROXETINE HYDROCHLORIDE HEMIHYDRATE 37.5 MG/1
37.5 TABLET, FILM COATED, EXTENDED RELEASE ORAL EVERY MORNING
Start: 2024-12-06

## 2024-12-06 RX ORDER — LISINOPRIL AND HYDROCHLOROTHIAZIDE 10; 12.5 MG/1; MG/1
1 TABLET ORAL DAILY
Qty: 100 TABLET | Refills: 1 | Status: SHIPPED | OUTPATIENT
Start: 2024-12-06

## 2024-12-06 RX ORDER — ALPRAZOLAM 0.25 MG/1
0.25 TABLET ORAL NIGHTLY PRN
Start: 2024-12-06

## 2024-12-06 RX ORDER — ALPRAZOLAM 0.25 MG/1
TABLET ORAL
COMMUNITY
Start: 2024-10-16 | End: 2024-12-06 | Stop reason: SDUPTHER

## 2024-12-06 RX ORDER — ATOMOXETINE 18 MG/1
18 CAPSULE ORAL DAILY
Start: 2024-12-06

## 2024-12-06 RX ORDER — ATOMOXETINE 18 MG/1
CAPSULE ORAL
COMMUNITY
Start: 2024-11-25 | End: 2024-12-06 | Stop reason: SDUPTHER

## 2024-12-06 RX ORDER — ARIPIPRAZOLE 15 MG/1
15 TABLET ORAL DAILY
Start: 2024-12-06

## 2024-12-06 NOTE — PROGRESS NOTES
Primary Care Physician: Jens Mendez MD    Date of Visit: 12/06/2024     Chief Complaint:   Chief Complaint   Patient presents with    Annual Exam       HPI:  HPI    Subjective:  Eloy Taylor is a 39 y.o. female presents annual physical.  States that she has been doing well overall.     Anxiety/depresion on abilfy and paxil per psyche.  These medications work well, but, she has gained a significant amt of wt over the yrs and the medications make her sleepy.  She and psychiatrist are reluctant to change medication b/c she has been stable.     Adhd--followed by psyche--on strattera     Obesity.    She would like help losing wt  States that she watches portion size and does exercise.     ROS:   Review of Systems   Constitutional:  Negative for activity change, chills, fatigue, fever and unexpected weight change.        Wt gain   HENT:  Negative for congestion, dental problem, ear pain, sinus pressure, sinus pain, sore throat and trouble swallowing.    Eyes:  Negative for photophobia, discharge, redness and visual disturbance.   Respiratory:  Negative for cough, chest tightness, shortness of breath and wheezing.    Cardiovascular:  Negative for chest pain, palpitations and leg swelling.   Gastrointestinal:  Negative for abdominal pain, blood in stool, constipation, diarrhea, nausea and vomiting.   Endocrine: Negative for cold intolerance, heat intolerance, polydipsia, polyphagia and polyuria.   Genitourinary:  Negative for difficulty urinating, dysuria, flank pain, frequency and urgency.   Musculoskeletal:  Negative for arthralgias, joint swelling and myalgias.   Skin:  Negative for color change and rash.   Allergic/Immunologic: Negative for environmental allergies, food allergies and immunocompromised state.   Neurological:  Negative for dizziness, weakness, light-headedness, numbness and headaches.   Hematological:  Does not bruise/bleed easily.   Psychiatric/Behavioral:  Negative for dysphoric mood and sleep  "disturbance. The patient is not nervous/anxious.             Allergies:  Allergies   Allergen Reactions    Aspirin Shortness of breath     Worsens her asthma    Doxylamin-Pse-Dm-Acetaminophen Anaphylaxis    Latex Itching    Nut - Unspecified Unknown     tree nuts    Animal Dander Unknown    Azithromycin Nausea/vomiting    Codeine Nausea And Vomiting and Nausea/vomiting    Hydrocodone-Acetaminophen GI Upset    Milk Rash    Sertraline Itching and Rash    Sumatriptan Unknown     Other reaction(s): Other: See Comments   Pt gets all side effect listed with medication    Pt gets all side effect listed with medication    Tramadol Dizziness     Other reaction(s): Other: See Comments   \"Drunk\" feeling    \"Drunk\" feeling       Outpatient Medications:  Current Outpatient Medications   Medication Instructions    adapalene (Differin) 0.3 % gel     Advair Diskus 250-50 mcg/dose diskus inhaler INHALE ONE PUFF BY MOUTH EVERY TWELVE HOURS    albuterol 90 mcg/actuation inhaler INHALE 2 PUFFS BY MOUTH EVERY 4 TO 6 HOURS  AS NEEDED FOR CHEST TIGHTNESS, COUGH, SHORTNESS OF BREATH, OR WHEEZING.    albuterol 1.25 mg, nebulization, Every 4 hours PRN    ALPRAZolam (XANAX) 0.25 mg, oral, Nightly PRN    ARIPiprazole (ABILIFY) 15 mg, oral, Daily    atomoxetine (STRATTERA) 18 mg, oral, Daily, Swallow capsule whole; do not open. If opened accidentally, do not touch eyes; wash hands immediately (product is an eye irritant).    atorvastatin (LIPITOR) 10 mg, oral, Daily    dupilumab (Dupixent) 300 mg/2 mL prefilled syringe Inject 600 mg sub cu for loading dose. Then  300 mg sub cu every 14 days    famotidine (PEPCID) 20 mg, Daily    lisinopriL-hydrochlorothiazide 10-12.5 mg tablet 1 tablet, oral, Daily    PARoxetine CR (PAXIL-CR) 37.5 mg, oral, Every morning    phentermine (ADIPEX-P) 37.5 mg, oral, Daily    triamcinolone (Kenalog) 0.1 % cream     triamcinolone (Nasacort) 55 mcg nasal inhaler 2 sprays, Daily       Vitals:  /80 (BP Location: " Left arm, Patient Position: Sitting)   Pulse 88   Temp 36.3 °C (97.3 °F) (Temporal)   Resp 16   Wt 90.7 kg (200 lb)   BMI 34.33 kg/m²   Wt Readings from Last 3 Encounters:   12/06/24 90.7 kg (200 lb)   12/20/23 83.5 kg (184 lb)   06/01/23 83.1 kg (183 lb 2 oz)       Physical Exam:  Physical Exam  Constitutional:       General: She is not in acute distress.     Appearance: Normal appearance. She is well-developed and well-groomed. She is obese.   HENT:      Head: Normocephalic and atraumatic.      Right Ear: Tympanic membrane and ear canal normal.      Left Ear: Tympanic membrane and ear canal normal.      Nose: Nose normal.      Mouth/Throat:      Mouth: Mucous membranes are moist.      Pharynx: Oropharynx is clear.   Eyes:      Conjunctiva/sclera: Conjunctivae normal.      Pupils: Pupils are equal, round, and reactive to light.   Neck:      Thyroid: No thyromegaly.   Cardiovascular:      Rate and Rhythm: Normal rate and regular rhythm.      Heart sounds: Normal heart sounds, S1 normal and S2 normal. No murmur heard.  Pulmonary:      Effort: Pulmonary effort is normal.      Breath sounds: Normal breath sounds and air entry. No wheezing, rhonchi or rales.   Abdominal:      General: Bowel sounds are normal. There is no distension.      Palpations: Abdomen is soft.      Tenderness: There is no abdominal tenderness. There is no guarding or rebound.   Musculoskeletal:         General: No swelling or tenderness. Normal range of motion.      Cervical back: Normal, full passive range of motion without pain, normal range of motion and neck supple.      Thoracic back: Normal.      Lumbar back: Normal.      Right lower leg: No edema.      Left lower leg: No edema.      Comments: Upper and lower extrems are wnl--inspection and palpation.   Strength is normal and symmetric.   Lymphadenopathy:      Cervical: No cervical adenopathy.   Skin:     General: Skin is warm and dry.      Findings: No bruising, erythema, lesion or  rash.      Comments: Intact   Neurological:      General: No focal deficit present.      Mental Status: She is alert and oriented to person, place, and time.      Sensory: Sensation is intact.      Motor: Motor function is intact.      Deep Tendon Reflexes: Reflexes are normal and symmetric.   Psychiatric:         Mood and Affect: Mood and affect normal.         Speech: Speech normal.         Behavior: Behavior normal. Behavior is cooperative.           Assessment/Plan   Diagnoses and all orders for this visit:  Annual physical exam  -     CBC and Auto Differential; Future  -     Comprehensive Metabolic Panel; Future  -     Lipid Panel; Future  -     Urinalysis with Reflex Microscopic; Future  -     Vitamin D 25-Hydroxy,Total (for eval of Vitamin D levels); Future  -     TSH with reflex to Free T4 if abnormal; Future  -     Hemoglobin A1C; Future  Mixed hyperlipidemia  -     CBC and Auto Differential; Future  -     Comprehensive Metabolic Panel; Future  -     Lipid Panel; Future  -     Urinalysis with Reflex Microscopic; Future  -     Vitamin D 25-Hydroxy,Total (for eval of Vitamin D levels); Future  -     TSH with reflex to Free T4 if abnormal; Future  -     Hemoglobin A1C; Future  -     atorvastatin (Lipitor) 10 mg tablet; Take 1 tablet (10 mg) by mouth once daily.  -     Lipid panel; Future  -     Hepatic function panel; Future  Anxiety  -     ALPRAZolam (Xanax) 0.25 mg tablet; Take 1 tablet (0.25 mg) by mouth as needed at bedtime for anxiety.  -     ARIPiprazole (Abilify) 15 mg tablet; Take 1 tablet (15 mg) by mouth once daily.  -     PARoxetine CR (Paxil-CR) 37.5 mg 24 hr tablet; Take 1 tablet (37.5 mg) by mouth once daily in the morning.  Attention deficit hyperactivity disorder (ADHD), unspecified ADHD type  -     atomoxetine (Strattera) 18 mg capsule; Take 1 capsule (18 mg) by mouth once daily. Swallow capsule whole; do not open. If opened accidentally, do not touch eyes; wash hands immediately (product is  an eye irritant).  Morbid obesity (Multi)  -     phentermine (Adipex-P) 37.5 mg tablet; Take 1 tablet (37.5 mg) by mouth once daily.  Morbid (severe) obesity due to excess calories (Multi)  -     phentermine (Adipex-P) 37.5 mg tablet; Take 1 tablet (37.5 mg) by mouth once daily.  Asthma, unspecified asthma severity, unspecified whether complicated, unspecified whether persistent ([facility])  Encounter for screening mammogram for malignant neoplasm of breast  -     BI mammo bilateral screening tomosynthesis; Future        Orders:  Orders Placed This Encounter   Procedures    BI mammo bilateral screening tomosynthesis    CBC and Auto Differential    Comprehensive Metabolic Panel    Lipid Panel    Urinalysis with Reflex Microscopic    Vitamin D 25-Hydroxy,Total (for eval of Vitamin D levels)    TSH with reflex to Free T4 if abnormal    Hemoglobin A1C    Lipid panel    Hepatic function panel      Followup Appts:  Future Appointments   Date Time Provider Department Center   1/15/2025  3:40 PM Jens Mendez MD [id number] 1 month       ____________________________________________________________  Jens Mendez MD

## 2024-12-10 PROBLEM — E66.01 MORBID OBESITY (MULTI): Status: ACTIVE | Noted: 2024-12-10

## 2024-12-10 PROBLEM — Z00.00 ANNUAL PHYSICAL EXAM: Status: ACTIVE | Noted: 2024-12-10

## 2024-12-10 PROBLEM — E66.01 MORBID (SEVERE) OBESITY DUE TO EXCESS CALORIES (MULTI): Status: ACTIVE | Noted: 2024-12-10

## 2024-12-10 PROBLEM — F90.9 ATTENTION DEFICIT HYPERACTIVITY DISORDER (ADHD): Status: ACTIVE | Noted: 2024-12-10

## 2024-12-10 RX ORDER — ATORVASTATIN CALCIUM 10 MG/1
10 TABLET, FILM COATED ORAL DAILY
Qty: 100 TABLET | Refills: 0 | Status: SHIPPED | OUTPATIENT
Start: 2024-12-10

## 2024-12-10 ASSESSMENT — ENCOUNTER SYMPTOMS
ARTHRALGIAS: 0
ACTIVITY CHANGE: 0
COLOR CHANGE: 0
CHEST TIGHTNESS: 0
MYALGIAS: 0
DIARRHEA: 0
COUGH: 0
DYSURIA: 0
FLANK PAIN: 0
UNEXPECTED WEIGHT CHANGE: 0
WHEEZING: 0
SORE THROAT: 0
CHILLS: 0
DYSPHORIC MOOD: 0
EYE DISCHARGE: 0
FEVER: 0
EYE REDNESS: 0
LIGHT-HEADEDNESS: 0
SINUS PAIN: 0
HEADACHES: 0
POLYDIPSIA: 0
VOMITING: 0
SLEEP DISTURBANCE: 0
WEAKNESS: 0
NUMBNESS: 0
DIZZINESS: 0
NAUSEA: 0
FATIGUE: 0
JOINT SWELLING: 0
PALPITATIONS: 0
DIFFICULTY URINATING: 0
BLOOD IN STOOL: 0
SINUS PRESSURE: 0
NERVOUS/ANXIOUS: 0
PHOTOPHOBIA: 0
BRUISES/BLEEDS EASILY: 0
ABDOMINAL PAIN: 0
TROUBLE SWALLOWING: 0
SHORTNESS OF BREATH: 0
CONSTIPATION: 0
FREQUENCY: 0
POLYPHAGIA: 0

## 2025-01-15 ENCOUNTER — APPOINTMENT (OUTPATIENT)
Dept: PRIMARY CARE | Facility: CLINIC | Age: 40
End: 2025-01-15
Payer: COMMERCIAL

## 2025-01-15 VITALS
HEART RATE: 115 BPM | RESPIRATION RATE: 18 BRPM | WEIGHT: 206.2 LBS | OXYGEN SATURATION: 96 % | TEMPERATURE: 97.3 F | HEIGHT: 64 IN | SYSTOLIC BLOOD PRESSURE: 124 MMHG | DIASTOLIC BLOOD PRESSURE: 70 MMHG | BODY MASS INDEX: 35.2 KG/M2

## 2025-01-15 DIAGNOSIS — E66.01 MORBID OBESITY DUE TO EXCESS CALORIES (MULTI): Primary | ICD-10-CM

## 2025-01-15 DIAGNOSIS — Z00.8 NUTRITIONAL ASSESSMENT: ICD-10-CM

## 2025-01-15 PROCEDURE — 3078F DIAST BP <80 MM HG: CPT | Performed by: INTERNAL MEDICINE

## 2025-01-15 PROCEDURE — 99213 OFFICE O/P EST LOW 20 MIN: CPT | Performed by: INTERNAL MEDICINE

## 2025-01-15 PROCEDURE — 3008F BODY MASS INDEX DOCD: CPT | Performed by: INTERNAL MEDICINE

## 2025-01-15 PROCEDURE — 3074F SYST BP LT 130 MM HG: CPT | Performed by: INTERNAL MEDICINE
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
## 2025-01-22 ENCOUNTER — HOSPITAL ENCOUNTER (OUTPATIENT)
Dept: RADIOLOGY | Facility: CLINIC | Age: 40
Discharge: HOME | End: 2025-01-22
Payer: COMMERCIAL

## 2025-01-22 DIAGNOSIS — Z12.31 ENCOUNTER FOR SCREENING MAMMOGRAM FOR MALIGNANT NEOPLASM OF BREAST: ICD-10-CM

## 2025-01-22 PROCEDURE — 77063 BREAST TOMOSYNTHESIS BI: CPT | Performed by: RADIOLOGY

## 2025-01-22 PROCEDURE — 77063 BREAST TOMOSYNTHESIS BI: CPT

## 2025-01-22 PROCEDURE — 77067 SCR MAMMO BI INCL CAD: CPT | Performed by: RADIOLOGY

## 2025-01-23 ENCOUNTER — APPOINTMENT (OUTPATIENT)
Dept: NUTRITION | Facility: HOSPITAL | Age: 40
End: 2025-01-23
Payer: COMMERCIAL

## 2025-02-04 ENCOUNTER — TELEMEDICINE CLINICAL SUPPORT (OUTPATIENT)
Dept: NUTRITION | Facility: CLINIC | Age: 40
End: 2025-02-04
Payer: COMMERCIAL

## 2025-02-04 VITALS — HEIGHT: 64 IN | BODY MASS INDEX: 35.39 KG/M2

## 2025-02-04 DIAGNOSIS — Z00.8 NUTRITIONAL ASSESSMENT: ICD-10-CM

## 2025-02-04 DIAGNOSIS — E66.01 MORBID OBESITY DUE TO EXCESS CALORIES (MULTI): Primary | ICD-10-CM

## 2025-02-04 PROCEDURE — 97802 MEDICAL NUTRITION INDIV IN: CPT | Mod: 95 | Performed by: DIETITIAN, REGISTERED

## 2025-02-04 NOTE — PROGRESS NOTES
"Nutrition Assessment     Reason for Visit:  Eloy Taylor is a 39 y.o. female who presents for nutrition counseling seeking weight loss.      Anthropometrics:  Wt Readings from Last 10 Encounters:   01/15/25 93.5 kg (206 lb 3.2 oz)   12/06/24 90.7 kg (200 lb)   12/20/23 83.5 kg (184 lb)   06/01/23 83.1 kg (183 lb 2 oz)   03/22/23 86.2 kg (190 lb 2 oz)   02/08/23 85.9 kg (189 lb 6 oz)   01/04/23 86.6 kg (191 lb)   03/31/22 81.6 kg (179 lb 15.7 oz)   08/24/21 82.1 kg (180 lb 14.4 oz)   02/25/21 80.7 kg (178 lb)     Lab Results   Component Value Date    HGBA1C 5.3 12/06/2024     Meds: Mounjaro, Paxil  Anthropometrics  Height: 162.6 cm (5' 4\")  Food And Nutrient Intake:  Food and Nutrient History  Food and Nutrient History: Pt presents for nutrition counseling via telephone to obtain nutrition support for weight loss. BMI indicates class II obesity.  Pt recent started on Mounjaro. She reports a 10-11# weight loss over the past couple of weeks.  Pt has multiple food allergies and reports a hx of \" lazy stomach\".  Pt finds that she has less Gi issues when she eats small, frequent meals.  She has a lack of variety in her diet due to allergies and intolerances.  She does not like a lot of vegetables but supplements with a vegetable powder.  Pt typically skips breakfast and is eating out excessively.  Encouraged her to eat increased protein~ aiming for 30g of protein with meals or up 100g per day to minimize loss of muscle with weight loss.  Food Allergy: Milk/lactose, Gluten/wheat, Eggs, Peanut  Food Intolerance: nuts SOB; casein     Food Intake  Meal 1: breakfast: 10:00- Protein box  Meal 2: noon- [place] [employer]- subs, chips,  Snacks : bread or chips  Meal 3: dinner 7:- Eats out 3-4x/week  Fluid Intake: water, Zero Energy  Pattern of Alcohol Consumption: none     Bioactive Substance Intake  Pattern of Alcohol Consumption: none     Physical Activities:  Physical Activity  Type of Physical Activity: none     Nutrition " "Focused Physical Exam:  Deferred- phone visit     Energy Needs  Calculated Energy Needs Using Equations  Height: 162.6 cm (5' 4\")  Weight Used for Equation Calculations: 88.5 kg (195 lb)  Upperglade- Robin Altamirano Equation (Overweight or Obese Patients): 1535  Activity Factor: 1.2  Total Energy Needs: 1842  Estimated Energy Needs  Total Energy Estimated Needs in 24 hours (kCal): 1342 kCal  Method for Estimating Needs: MSJ x 1.2-500 kcal  Estimated Protein Needs  Total Protein Estimated Needs in 24 Hours (g): 100 g  Method for Estimating 24 Hour Protein Needs: 30% of est calories    Nutrition Diagnosis   Malnutrition Diagnosis  Patient has Malnutrition Diagnosis: No  Nutrition Diagnosis  Patient has Nutrition Diagnosis: Yes  Diagnosis Status (1): New  Nutrition Diagnosis 1: Obesity class II  Related to (1): unintended weight gain secondary to medication side-effects  As Evidenced by (1): BMI; pt report  Additional Nutrition Diagnosis: Diagnosis 2  Diagnosis Status (2): New  Nutrition Diagnosis 2: Altered GI function  Related to (2): impaired GI function  As Evidenced by (2): pt interview    Nutrition Interventions/Recommendations   Nutrition Education  Nutrition Education Content: Content related nutrition education, Education on nutrition's influence on health, Physical activity guidance  Goals: Instructed on counting macronutrient intake, proper portion sizes, and general healthful nutrition. Instructed on benefits of wt loss with overall health.  Instructed pt to not skip meals - discussed this may lead to over eating later or snacking more than planned.  Encourage maintaining routines.  Instructed on importance of physical activity for wt loss and maintenance of wt loss. Both verbal and written education provided in the one-on-one setting. Pt verbalized understanding of information provided. All questions answered to pt satisfaction throughout visit  Nutrition Counseling  Nutrition Counseling Strategies : Nutrition " counseling based on problem solving strategy        Patient Instructions   Focus on eating small, frequent meals or mini-meals 5-6x/day.  Include a protein food with each meal and snack such as almonds or almond butters, meat, fish, and protein drinks.    Consider making smoothies with almond milk or water, protein powders(such as Orgain) and frozen fruits.  Aim for 7871-1440 kcals per day to promote gradual weight loss   - Consider tracking your meal intake on an tamra such as Motobuykers or BrightNest to track your calories.    Consider a high protein supplement such as Ensure Max, OWYN shakes or Premier Protein which can be used as a small meal.       6. Aim for 64 oz of water daily.      Nutrition Monitoring and Evaluation   Food and Nutrient Intake  Monitoring and Evaluation Plan: Energy intake, Meal/snack pattern, Fiber intake, Carbohydrate intake, Amount of food, Fluid intake, Protein intake  Criteria: 6644-1610 kcal/day  Criteria: aim for at least 64 oz of water daily  Criteria: Aim for 3 meals/day with 1-2 snack  Meal/Snack Pattern: Food variety, Estimated meal and snack pattern  Criteria: Follow plate method when planning meals (1/2 plate non-starchy vegetables, 1/4 plate lean protein, 1/4 plate carbohydrates).  Criteria: Choose lean protein sources including chicken, turkey, seafood, and almons/almond butter. Be sure to include protein with each meal and snack  Criteria: Limit added sugar to less than 25 g per day  Criteria: Increase fiber from fruits, vegetables, whole grains, and beans/lentils  Additional Plan: Provided pt with the following handouts: wt loss tips, 1200 calorie 5 day meal plan, choose high proteins foods list  Knowledge Belief Attitude Determination   Monitoring and Evaluation Plan: Physical activity  Criteria: Encouraged regular physical activity including strength training as medically appropriate per AHA guidelines  Anthropometric measurements  Monitoring and Evaluation Plan: Weight  change  Criteria: 1-2 lb wt loss per week    Readiness to Change : Fair  Level of Understanding : Fair  Anticipated Compliant : Good

## 2025-02-04 NOTE — PATIENT INSTRUCTIONS
Focus on eating small, frequent meals or mini-meals 5-6x/day.  Include a protein food with each meal and snack such as almonds or almond butters, meat, fish, and protein drinks.    Consider making smoothies with almond milk or water, protein powders(such as Orgain) and frozen fruits.  Aim for 9943-6179 kcals per day to promote gradual weight loss   - Consider tracking your meal intake on an tamra such as Intcomex or Fitly to track your calories.    Consider a high protein supplement such as Ensure Max, OWYN shakes or Premier Protein which can be used as a small meal.       6. Aim for 64 oz of water daily.

## 2025-02-06 DIAGNOSIS — J45.909 ASTHMA, UNSPECIFIED ASTHMA SEVERITY, UNSPECIFIED WHETHER COMPLICATED, UNSPECIFIED WHETHER PERSISTENT (HHS-HCC): Primary | ICD-10-CM

## 2025-02-06 DIAGNOSIS — J45.909 ASTHMA, UNSPECIFIED ASTHMA SEVERITY, UNSPECIFIED WHETHER COMPLICATED, UNSPECIFIED WHETHER PERSISTENT (HHS-HCC): ICD-10-CM

## 2025-02-06 DIAGNOSIS — E66.01 MORBID OBESITY DUE TO EXCESS CALORIES (MULTI): ICD-10-CM

## 2025-02-06 RX ORDER — FLUTICASONE PROPIONATE AND SALMETEROL 50; 250 UG/1; UG/1
POWDER RESPIRATORY (INHALATION)
Qty: 60 EACH | Refills: 1 | Status: SHIPPED | OUTPATIENT
Start: 2025-02-06

## 2025-02-10 RX ORDER — TIRZEPATIDE 2.5 MG/.5ML
INJECTION, SOLUTION SUBCUTANEOUS
Qty: 2 ML | Refills: 0 | OUTPATIENT
Start: 2025-02-10

## 2025-02-13 ENCOUNTER — APPOINTMENT (OUTPATIENT)
Dept: ALLERGY | Facility: CLINIC | Age: 40
End: 2025-02-13
Payer: COMMERCIAL

## 2025-02-13 DIAGNOSIS — J45.40 MODERATE PERSISTENT ASTHMA WITHOUT COMPLICATION (HHS-HCC): Primary | ICD-10-CM

## 2025-02-13 NOTE — PATIENT INSTRUCTIONS
Continue Dupixent     Continue Advair.     Continue albuterol as needed.    Follow up in   or sooner if problems or symptoms worsen prior.

## 2025-02-13 NOTE — PROGRESS NOTES
Subjective   Patient ID:   [id number]   Eloy Taylor is a 39 y.o. female who presents for No chief complaint on file..    No chief complaint on file.     Patient presents for F/U of asthma/Dupixent, started June 2024.    Since last visit, 6-26-24, patient reports she receives Xolair every 3 weeks for her asthma and her asthma is under excellent control.     Nonetheless, she is currently seeing dermatology and is requiring but she is C/O eczema flaring on her arms, face and scalp despite using halobetasol, tacrolimus and Kenalog cream.  In addition, she is developing acne due to the creams. She saw [facility] and was recommended switching to Dupixent.      She continues Advair once a day.       Review of Systems    Objective   There were no vitals taken for this visit.     Physical Exam  Constitutional:       Appearance: Normal appearance.   HENT:      Head: Normocephalic and atraumatic.      Right Ear: External ear normal. There is no impacted cerumen.      Left Ear: External ear normal. There is no impacted cerumen.      Nose: No congestion or rhinorrhea.   Eyes:      Extraocular Movements: Extraocular movements intact.      Conjunctiva/sclera: Conjunctivae normal.      Pupils: Pupils are equal, round, and reactive to light.   Cardiovascular:      Rate and Rhythm: Normal rate and regular rhythm.      Heart sounds: No murmur heard.     No friction rub. No gallop.   Pulmonary:      Effort: No respiratory distress.      Breath sounds: No wheezing, rhonchi or rales.   Skin:     General: Skin is warm and dry.   Neurological:      Mental Status: She is alert.   Psychiatric:         Mood and Affect: Mood normal.         Behavior: Behavior normal.       Assessment/Plan   {Assess/PlanSmartLinks:99736}  No problem-specific Assessment & Plan notes found for this encounter.    By signing my name below, IJennifer Scribe, attest that this documentation has been prepared under the direction and in the  presence of Mayda Beard MD.  All medical record entries made by the Scribe were at my direction and personally dictated by me. I have reviewed the chart and agree that the record accurately reflects my personal performance of the history, physical exam, discussion and plan.

## 2025-02-24 ENCOUNTER — APPOINTMENT (OUTPATIENT)
Dept: PRIMARY CARE | Facility: CLINIC | Age: 40
End: 2025-02-24
Payer: COMMERCIAL

## 2025-02-24 VITALS
SYSTOLIC BLOOD PRESSURE: 116 MMHG | TEMPERATURE: 97.6 F | HEART RATE: 90 BPM | HEIGHT: 64 IN | WEIGHT: 198 LBS | BODY MASS INDEX: 33.8 KG/M2 | RESPIRATION RATE: 16 BRPM | DIASTOLIC BLOOD PRESSURE: 73 MMHG

## 2025-02-24 DIAGNOSIS — E66.01 MORBID OBESITY DUE TO EXCESS CALORIES (MULTI): ICD-10-CM

## 2025-02-24 PROCEDURE — 3078F DIAST BP <80 MM HG: CPT | Performed by: INTERNAL MEDICINE

## 2025-02-24 PROCEDURE — 99213 OFFICE O/P EST LOW 20 MIN: CPT | Performed by: INTERNAL MEDICINE

## 2025-02-24 PROCEDURE — 3008F BODY MASS INDEX DOCD: CPT | Performed by: INTERNAL MEDICINE

## 2025-02-24 PROCEDURE — 3074F SYST BP LT 130 MM HG: CPT | Performed by: INTERNAL MEDICINE

## 2025-02-24 NOTE — PROGRESS NOTES
Date of Visit: 02/24/2025     Chief Complaint:   Chief Complaint   Patient presents with    Follow-up     1 month follow up        HPI:  HPI  Subjective:   Eloy Taylor is a 39 y.o. female presents   Obesity.   Gained wt 2/2 medications.   She has struggled to lose wt for some time.    Started zepbound last month.    She has lost about 8 pounds!   She also spoke to a nutritionist.  Would like to continue, but with someone else.  She will need another referral.    Side effects:  mild nausea  No constipation    ROS:   Review of Systems      Outpatient Medications:  Current Outpatient Medications   Medication Instructions    adapalene (Differin) 0.3 % gel     Advair Diskus 250-50 mcg/dose diskus inhaler INHALE ONE PUFF BY MOUTH EVERY TWELVE HOURS    albuterol 90 mcg/actuation inhaler INHALE 2 PUFFS BY MOUTH EVERY 4 TO 6 HOURS  AS NEEDED FOR CHEST TIGHTNESS, COUGH, SHORTNESS OF BREATH, OR WHEEZING.    albuterol 1.25 mg, nebulization, Every 4 hours PRN    ALPRAZolam (XANAX) 0.25 mg, oral, Nightly PRN    ARIPiprazole (ABILIFY) 15 mg, oral, Daily    atorvastatin (LIPITOR) 10 mg, oral, Daily    dupilumab (Dupixent) 300 mg/2 mL prefilled syringe Inject 600 mg sub cu for loading dose. Then  300 mg sub cu every 14 days    famotidine (PEPCID) 20 mg, Daily    lisinopriL-hydrochlorothiazide 10-12.5 mg tablet 1 tablet, oral, Daily    PARoxetine CR (PAXIL-CR) 37.5 mg, oral, Every morning    tirzepatide (weight loss) (ZEPBOUND) 5 mg, subcutaneous, Every 7 days    triamcinolone (Kenalog) 0.1 % cream     triamcinolone (Nasacort) 55 mcg nasal inhaler 2 sprays, Daily       Allergies:  Allergies   Allergen Reactions    Aspirin Shortness of breath     Worsens her asthma    Doxylamin-Pse-Dm-Acetaminophen Anaphylaxis    Latex Itching    Nut - Unspecified Unknown     tree nuts    Animal Dander Unknown    Azithromycin Nausea/vomiting    Codeine Nausea And Vomiting and Nausea/vomiting    Hydrocodone-Acetaminophen GI Upset    Milk Rash     "Sertraline Itching and Rash    Sumatriptan Unknown     Other reaction(s): Other: See Comments   Pt gets all side effect listed with medication    Pt gets all side effect listed with medication    Tramadol Dizziness     Other reaction(s): Other: See Comments   \"Drunk\" feeling    \"Drunk\" feeling       Past Medical History:   Diagnosis Date    ADHD (attention deficit hyperactivity disorder)     Allergic     Anxiety     Asthma     Eczema     Encounter for fertility testing 01/14/2018    Fertility testing    Encounter for other general counseling and advice on procreation 10/26/2017    Encounter for preconception consultation    Headache, unspecified     Bad headache    Hypertension     Maternal care for (suspected) hereditary disease in fetus, not applicable or unspecified ([facility]) 10/26/2017    Hereditary disease in family possibly affecting fetus    Other allergy status, other than to drugs and biological substances     History of environmental allergies    Other specified health status 08/31/2018    Contraception    Other specified health status 08/31/2018    Contraception    Other specified postprocedural states 01/19/2018    History of in vitro fertilization    Pain in unspecified knee 02/21/2019    Chronic knee pain    Pain in unspecified shoulder 12/22/2016    Acromioclavicular pain    Personal history of other diseases of the circulatory system 12/28/2015    History of essential hypertension    Personal history of other diseases of the circulatory system     History of hypertension    Personal history of other diseases of the nervous system and sense organs 05/16/2017    History of migraine    Personal history of other diseases of the respiratory system     History of asthma    Personal history of other mental and behavioral disorders 10/25/2017    History of anxiety disorder    Recurrent pregnancy loss 10/09/2017    Recurrent pregnancy loss without current pregnancy        Health Maintenance   Topic Date Due " "   Varicella Vaccines (1 of 2 - 13+ 2-dose series) Never done    Hepatitis B Vaccines (1 of 3 - 19+ 3-dose series) Never done    Pneumococcal Vaccine: Pediatrics and At-Risk Adult Patients (2 of 2 - PCV) 02/19/2012    DTaP/Tdap/Td Vaccines (8 - Tdap) 02/19/2019    Influenza Vaccine (1) 09/01/2024    COVID-19 Vaccine (3 - 2024-25 season) 09/01/2024    Diabetes Screening  12/06/2025    Yearly Adult Physical  12/07/2025    Mammogram  01/22/2026    Cervical Cancer Screening  07/17/2027    Lipid Panel  12/06/2029    Zoster Vaccines (1 of 2) 11/30/2033    IPV Vaccines  Completed    MMR Vaccines  Completed    HIV Screening  Completed    Hepatitis C Screening  Completed    HIB Vaccines  Aged Out    Hepatitis A Vaccines  Aged Out    Meningococcal Vaccine  Aged Out    Rotavirus Vaccines  Aged Out    HPV Vaccines  Aged Out        Past Surgical History:   Procedure Laterality Date    DILATION AND CURETTAGE OF UTERUS  06/29/2015    Dilation And Curettage    MOUTH SURGERY  12/07/2015    Oral Surgery Tooth Extraction    OTHER SURGICAL HISTORY  01/04/2023    Elbow surgery    SINUS SURGERY          Family History   Problem Relation Name Age of Onset    Ovarian cancer Paternal Grandmother Edith Panchal         Social History     Socioeconomic History    Marital status:    Occupational History    Occupation: construction   Tobacco Use    Smoking status: Never    Smokeless tobacco: Never   Vaping Use    Vaping status: Never Used   Substance and Sexual Activity    Alcohol use: Not Currently    Drug use: Not Currently     Types: Marijuana    Sexual activity: Yes     Partners: Male     Birth control/protection: I.U.D.          Vitals:  /73   Pulse 90   Temp 36.4 °C (97.6 °F) (Temporal)   Resp 16   Ht 1.626 m (5' 4\")   Wt 89.8 kg (198 lb)   BMI 33.99 kg/m²   Wt Readings from Last 3 Encounters:   02/24/25 89.8 kg (198 lb)   01/15/25 93.5 kg (206 lb 3.2 oz)   12/06/24 90.7 kg (200 lb)     BP Readings from Last 3 " Encounters:   02/24/25 116/73   01/15/25 124/70   12/06/24 125/80        Physical Exam:  Physical Exam  Vitals reviewed.   Constitutional:       Appearance: Normal appearance.   HENT:      Head: Normocephalic and atraumatic.   Cardiovascular:      Rate and Rhythm: Normal rate and regular rhythm.      Heart sounds: Normal heart sounds, S1 normal and S2 normal. No murmur heard.  Pulmonary:      Effort: Pulmonary effort is normal.      Breath sounds: Normal breath sounds. No wheezing, rhonchi or rales.   Abdominal:      General: Bowel sounds are normal.      Palpations: Abdomen is soft.   Skin:     General: Skin is warm and dry.   Neurological:      General: No focal deficit present.      Mental Status: She is alert and oriented to person, place, and time.          Assessment/Plan   Diagnoses and all orders for this visit:  Morbid obesity due to excess calories (Multi)  -     tirzepatide, weight loss, (Zepbound) 5 mg/0.5 mL injection; Inject 5 mg under the skin every 7 days.  -     Referral to Nutrition Services; Future      Orders:  Orders Placed This Encounter   Procedures    Referral to Nutrition Services        Followup Appts:  Future Appointments   Date Time Provider Department Center   3/24/2025 11:40 AM Jens Mendez MD [id number]   5/1/2025  4:15 PM Mayda Beard MD [id number] [place]           ____________________________________________________________  Jens Mendez MD

## 2025-03-24 ENCOUNTER — APPOINTMENT (OUTPATIENT)
Dept: PRIMARY CARE | Facility: CLINIC | Age: 40
End: 2025-03-24
Payer: COMMERCIAL

## 2025-03-24 VITALS
RESPIRATION RATE: 18 BRPM | DIASTOLIC BLOOD PRESSURE: 78 MMHG | SYSTOLIC BLOOD PRESSURE: 124 MMHG | TEMPERATURE: 97.1 F | WEIGHT: 195 LBS | OXYGEN SATURATION: 98 % | HEART RATE: 107 BPM | HEIGHT: 64 IN | BODY MASS INDEX: 33.29 KG/M2

## 2025-03-24 DIAGNOSIS — E66.01 MORBID OBESITY (MULTI): Primary | ICD-10-CM

## 2025-03-24 DIAGNOSIS — Z79.899 ENCOUNTER FOR LONG-TERM (CURRENT) USE OF MEDICATIONS: ICD-10-CM

## 2025-03-24 DIAGNOSIS — E78.2 MIXED HYPERLIPIDEMIA: ICD-10-CM

## 2025-03-24 LAB
ALBUMIN SERPL-MCNC: 4.5 G/DL (ref 3.6–5.1)
ALBUMIN/GLOB SERPL: 2 (CALC) (ref 1–2.5)
ALP SERPL-CCNC: 44 U/L (ref 31–125)
ALT SERPL-CCNC: 18 U/L (ref 6–29)
ANION GAP SERPL CALCULATED.4IONS-SCNC: 11 MMOL/L (CALC) (ref 7–17)
AST SERPL-CCNC: 16 U/L (ref 10–30)
BILIRUB DIRECT SERPL-MCNC: 0.1 MG/DL
BILIRUB INDIRECT SERPL-MCNC: 0.6 MG/DL (CALC) (ref 0.2–1.2)
BILIRUB SERPL-MCNC: 0.7 MG/DL (ref 0.2–1.2)
BUN SERPL-MCNC: 9 MG/DL (ref 7–25)
BUN/CREAT SERPL: NORMAL (CALC) (ref 6–22)
CALCIUM SERPL-MCNC: 9.3 MG/DL (ref 8.6–10.2)
CHLORIDE SERPL-SCNC: 106 MMOL/L (ref 98–110)
CHOLEST SERPL-MCNC: 150 MG/DL
CHOLEST/HDLC SERPL: 3.6 (CALC)
CO2 SERPL-SCNC: 23 MMOL/L (ref 20–32)
CREAT SERPL-MCNC: 0.98 MG/DL (ref 0.5–0.99)
EGFRCR SERPLBLD CKD-EPI 2021: 74 ML/MIN/1.73M2
GLOBULIN SER CALC-MCNC: 2.2 G/DL (CALC) (ref 1.9–3.7)
GLUCOSE SERPL-MCNC: 101 MG/DL (ref 65–139)
HDLC SERPL-MCNC: 42 MG/DL
LDLC SERPL CALC-MCNC: 78 MG/DL (CALC)
NONHDLC SERPL-MCNC: 108 MG/DL (CALC)
POTASSIUM SERPL-SCNC: 4.1 MMOL/L (ref 3.5–5.3)
PROT SERPL-MCNC: 6.7 G/DL (ref 6.1–8.1)
SODIUM SERPL-SCNC: 140 MMOL/L (ref 135–146)
TRIGL SERPL-MCNC: 201 MG/DL

## 2025-03-24 PROCEDURE — 3074F SYST BP LT 130 MM HG: CPT | Performed by: INTERNAL MEDICINE

## 2025-03-24 PROCEDURE — 3078F DIAST BP <80 MM HG: CPT | Performed by: INTERNAL MEDICINE

## 2025-03-24 PROCEDURE — 3008F BODY MASS INDEX DOCD: CPT | Performed by: INTERNAL MEDICINE

## 2025-03-24 PROCEDURE — 99214 OFFICE O/P EST MOD 30 MIN: CPT | Performed by: INTERNAL MEDICINE

## 2025-03-24 NOTE — PROGRESS NOTES
Date of Visit: 03/24/2025     Chief Complaint:   Chief Complaint   Patient presents with    Follow-up     1 month/weigh in       HPI:  HPI  Subjective:   Eloy Taylor is a 39 y.o. female presents f/up   mounjaro  On zepbound  to aid in wt loss.    She states that she feels well  +mild nausea 1-2 days after she injects the medication.  No constipation/diarrhea.   She went on vacation.  Still managed to lose wt.      High chol.   She has been taking atorvastatin.    Due for lipid levels.   ROS:   Review of Systems      Outpatient Medications:  Current Outpatient Medications   Medication Instructions    adapalene (Differin) 0.3 % gel     Advair Diskus 250-50 mcg/dose diskus inhaler INHALE ONE PUFF BY MOUTH EVERY TWELVE HOURS    albuterol 90 mcg/actuation inhaler INHALE 2 PUFFS BY MOUTH EVERY 4 TO 6 HOURS  AS NEEDED FOR CHEST TIGHTNESS, COUGH, SHORTNESS OF BREATH, OR WHEEZING.    albuterol 1.25 mg, nebulization, Every 4 hours PRN    ALPRAZolam (XANAX) 0.25 mg, oral, Nightly PRN    ARIPiprazole (ABILIFY) 15 mg, oral, Daily    atorvastatin (LIPITOR) 10 mg, oral, Daily    dupilumab (Dupixent) 300 mg/2 mL prefilled syringe Inject 600 mg sub cu for loading dose. Then  300 mg sub cu every 14 days    famotidine (PEPCID) 20 mg, Daily    lisinopriL-hydrochlorothiazide 10-12.5 mg tablet 1 tablet, oral, Daily    PARoxetine CR (PAXIL-CR) 37.5 mg, oral, Every morning    tirzepatide (weight loss) (ZEPBOUND) 7.5 mg, subcutaneous, Every 7 days    [START ON 4/21/2025] tirzepatide (weight loss) (ZEPBOUND) 10 mg, subcutaneous, Every 7 days    triamcinolone (Kenalog) 0.1 % cream     triamcinolone (Nasacort) 55 mcg nasal inhaler 2 sprays, Daily       Allergies:  Allergies   Allergen Reactions    Aspirin Shortness of breath     Worsens her asthma    Doxylamin-Pse-Dm-Acetaminophen Anaphylaxis    Latex Itching    Nut - Unspecified Unknown     tree nuts    Animal Dander Unknown    Azithromycin Nausea/vomiting    Codeine Nausea And  "Vomiting and Nausea/vomiting    Hydrocodone-Acetaminophen GI Upset    Milk Rash    Sertraline Itching and Rash    Sumatriptan Unknown     Other reaction(s): Other: See Comments   Pt gets all side effect listed with medication    Pt gets all side effect listed with medication    Tramadol Dizziness     Other reaction(s): Other: See Comments   \"Drunk\" feeling    \"Drunk\" feeling       Past Medical History:   Diagnosis Date    ADHD (attention deficit hyperactivity disorder)     Allergic     Anxiety     Asthma     Eczema     Encounter for fertility testing 01/14/2018    Fertility testing    Encounter for other general counseling and advice on procreation 10/26/2017    Encounter for preconception consultation    Headache, unspecified     Bad headache    Hypertension     Maternal care for (suspected) hereditary disease in fetus, not applicable or unspecified ([facility]) 10/26/2017    Hereditary disease in family possibly affecting fetus    Other allergy status, other than to drugs and biological substances     History of environmental allergies    Other specified health status 08/31/2018    Contraception    Other specified health status 08/31/2018    Contraception    Other specified postprocedural states 01/19/2018    History of in vitro fertilization    Pain in unspecified knee 02/21/2019    Chronic knee pain    Pain in unspecified shoulder 12/22/2016    Acromioclavicular pain    Personal history of other diseases of the circulatory system 12/28/2015    History of essential hypertension    Personal history of other diseases of the circulatory system     History of hypertension    Personal history of other diseases of the nervous system and sense organs 05/16/2017    History of migraine    Personal history of other diseases of the respiratory system     History of asthma    Personal history of other mental and behavioral disorders 10/25/2017    History of anxiety disorder    Recurrent pregnancy loss 10/09/2017    Recurrent " "pregnancy loss without current pregnancy        Health Maintenance   Topic Date Due    Varicella Vaccines (1 of 2 - 13+ 2-dose series) Never done    Hepatitis B Vaccines (1 of 3 - 19+ 3-dose series) Never done    Pneumococcal Vaccine: Pediatrics and At-Risk Adult Patients (2 of 2 - PCV) 02/19/2012    DTaP/Tdap/Td Vaccines (8 - Tdap) 02/19/2019    Influenza Vaccine (1) 09/01/2024    COVID-19 Vaccine (3 - 2024-25 season) 09/01/2024    Yearly Adult Physical  12/07/2025    Mammogram  01/22/2026    Diabetes Screening  03/24/2026    Cervical Cancer Screening  07/17/2027    Lipid Panel  03/24/2030    Zoster Vaccines (1 of 2) 11/30/2033    IPV Vaccines  Completed    MMR Vaccines  Completed    HIV Screening  Completed    Hepatitis C Screening  Completed    HIB Vaccines  Aged Out    Hepatitis A Vaccines  Aged Out    Meningococcal Vaccine  Aged Out    Rotavirus Vaccines  Aged Out    HPV Vaccines  Aged Out       Past Surgical History:   Procedure Laterality Date    DILATION AND CURETTAGE OF UTERUS  06/29/2015    Dilation And Curettage    MOUTH SURGERY  12/07/2015    Oral Surgery Tooth Extraction    OTHER SURGICAL HISTORY  01/04/2023    Elbow surgery    SINUS SURGERY         Family History   Problem Relation Name Age of Onset    Ovarian cancer Paternal Grandmother Edith Panchal          Social History     Socioeconomic History    Marital status:    Occupational History    Occupation: construction   Tobacco Use    Smoking status: Never    Smokeless tobacco: Never   Vaping Use    Vaping status: Never Used   Substance and Sexual Activity    Alcohol use: Not Currently    Drug use: Not Currently     Types: Marijuana    Sexual activity: Yes     Partners: Male     Birth control/protection: I.U.D.          Vitals:  /78 (BP Location: Left arm, Patient Position: Sitting, BP Cuff Size: Adult)   Pulse 107   Temp 36.2 °C (97.1 °F) (Temporal)   Resp 18   Ht 1.626 m (5' 4\")   Wt 88.5 kg (195 lb)   SpO2 98%   BMI 33.47 kg/m² "   Wt Readings from Last 3 Encounters:   03/24/25 88.5 kg (195 lb)   02/24/25 89.8 kg (198 lb)   01/15/25 93.5 kg (206 lb 3.2 oz)     BP Readings from Last 3 Encounters:   03/24/25 124/78   02/24/25 116/73   01/15/25 124/70        Physical Exam:  Physical Exam  Vitals reviewed.   Constitutional:       Appearance: Normal appearance.   HENT:      Head: Normocephalic and atraumatic.   Cardiovascular:      Rate and Rhythm: Normal rate and regular rhythm.      Heart sounds: Normal heart sounds, S1 normal and S2 normal. No murmur heard.  Pulmonary:      Effort: Pulmonary effort is normal.      Breath sounds: Normal breath sounds. No wheezing, rhonchi or rales.   Abdominal:      General: Bowel sounds are normal.      Palpations: Abdomen is soft.   Skin:     General: Skin is warm and dry.   Neurological:      General: No focal deficit present.      Mental Status: She is alert and oriented to person, place, and time.          Assessment/Plan   Diagnoses and all orders for this visit:  Morbid obesity (Multi)  -     tirzepatide, weight loss, (Zepbound) 7.5 mg/0.5 mL injection; Inject 7.5 mg under the skin every 7 days.  -     tirzepatide, weight loss, (Zepbound) 10 mg/0.5 mL injection; Inject 10 mg under the skin every 7 days. Do not fill before April 21, 2025.  Encounter for long-term (current) use of medications  -     Hemoglobin A1c; Future  -     Basic metabolic panel; Future  -     tirzepatide, weight loss, (Zepbound) 7.5 mg/0.5 mL injection; Inject 7.5 mg under the skin every 7 days.  -     tirzepatide, weight loss, (Zepbound) 10 mg/0.5 mL injection; Inject 10 mg under the skin every 7 days. Do not fill before April 21, 2025.  Mixed hyperlipidemia  Check bs/hgba1c.      Orders:  Orders Placed This Encounter   Procedures    Hemoglobin A1c    Basic metabolic panel        Followup Appts:  Future Appointments   Date Time Provider Department Center   4/11/2025  9:40 AM Jens Mendez MD [id number]   5/1/2025  4:15 PM  Mayda Beard MD [id number]           ____________________________________________________________  Jens Mendez MD

## 2025-03-25 PROBLEM — Z79.899 ENCOUNTER FOR LONG-TERM (CURRENT) USE OF MEDICATIONS: Status: ACTIVE | Noted: 2025-03-25

## 2025-03-25 LAB
EST. AVERAGE GLUCOSE BLD GHB EST-MCNC: 103 MG/DL
EST. AVERAGE GLUCOSE BLD GHB EST-SCNC: 5.7 MMOL/L
HBA1C MFR BLD: 5.2 % OF TOTAL HGB

## 2025-03-25 RX ORDER — ATORVASTATIN CALCIUM 10 MG/1
10 TABLET, FILM COATED ORAL DAILY
Qty: 100 TABLET | Refills: 1 | Status: SHIPPED | OUTPATIENT
Start: 2025-03-25

## 2025-04-07 DIAGNOSIS — J45.909 ASTHMA, UNSPECIFIED ASTHMA SEVERITY, UNSPECIFIED WHETHER COMPLICATED, UNSPECIFIED WHETHER PERSISTENT (HHS-HCC): ICD-10-CM

## 2025-04-07 RX ORDER — ALBUTEROL SULFATE 0.83 MG/ML
SOLUTION RESPIRATORY (INHALATION)
Qty: 75 ML | Refills: 0 | Status: SHIPPED | OUTPATIENT
Start: 2025-04-07

## 2025-04-11 ENCOUNTER — OFFICE VISIT (OUTPATIENT)
Dept: PRIMARY CARE | Facility: CLINIC | Age: 40
End: 2025-04-11
Payer: COMMERCIAL

## 2025-04-11 ENCOUNTER — APPOINTMENT (OUTPATIENT)
Dept: PRIMARY CARE | Facility: CLINIC | Age: 40
End: 2025-04-11
Payer: COMMERCIAL

## 2025-04-11 VITALS
DIASTOLIC BLOOD PRESSURE: 80 MMHG | SYSTOLIC BLOOD PRESSURE: 118 MMHG | OXYGEN SATURATION: 100 % | HEIGHT: 64 IN | HEART RATE: 65 BPM | BODY MASS INDEX: 32.64 KG/M2 | TEMPERATURE: 96.6 F | WEIGHT: 191.2 LBS | RESPIRATION RATE: 18 BRPM

## 2025-04-11 DIAGNOSIS — E66.01 MORBID OBESITY (MULTI): ICD-10-CM

## 2025-04-11 DIAGNOSIS — Z79.899 ENCOUNTER FOR LONG-TERM (CURRENT) USE OF MEDICATIONS: ICD-10-CM

## 2025-04-11 PROCEDURE — 3074F SYST BP LT 130 MM HG: CPT | Performed by: INTERNAL MEDICINE

## 2025-04-11 PROCEDURE — 3008F BODY MASS INDEX DOCD: CPT | Performed by: INTERNAL MEDICINE

## 2025-04-11 PROCEDURE — 3079F DIAST BP 80-89 MM HG: CPT | Performed by: INTERNAL MEDICINE

## 2025-04-11 PROCEDURE — 99213 OFFICE O/P EST LOW 20 MIN: CPT | Performed by: INTERNAL MEDICINE

## 2025-04-11 PROCEDURE — 1036F TOBACCO NON-USER: CPT | Performed by: INTERNAL MEDICINE

## 2025-04-11 NOTE — PROGRESS NOTES
"Subjective   Patient ID: Eloy Taylor is a 39 y.o. female who presents for Follow-up (1 month/weight loss medication).    HPI   Here for monthly follow up for weight loss med refill of Zepbound. She also takes ADHD med methylphenidate  She is tolerating it well, without adverse effect , BP and HR are normal.   She denies smoking , drinking alcohol  Review of Systems  See HPI  Objective   /80 (BP Location: Left arm, Patient Position: Sitting, BP Cuff Size: Adult)   Pulse 65   Temp 35.9 °C (96.6 °F) (Temporal)   Resp 18   Ht 1.626 m (5' 4\")   Wt 86.7 kg (191 lb 3.2 oz)   SpO2 100%   BMI 32.82 kg/m²   BMI was 33.47 , and 33.99 on previous visits    Physical Exam  Constitutional:       Appearance: She is obese.   Skin:     General: Skin is warm and dry.      Findings: No rash.   Neurological:      General: No focal deficit present.      Mental Status: She is alert and oriented to person, place, and time.         Assessment/Plan        Obesity / Overweight :  Refill Zepbound 7.5 mg weekly  Monitor weight   Goal BMI < 30  "

## 2025-04-30 NOTE — PROGRESS NOTES
Subjective   Patient ID:   [id number]   Eloy Taylor is a 39 y.o. female who presents for Asthma and Allergic Rhinitis (Annual follow up dupixent renewal ).    Chief Complaint   Patient presents with    Asthma    Allergic Rhinitis     Annual follow up dupixent renewal       Patient presents for F/U of asthma.  Started Dupixent     Since last visit, 6-26-24, patient reports she has been on Dupixent for a year and takes it every other week.  She denies any SE and is improved but still has periorbital itching.  She has been using Kenalog prescribed by [facility] but uses it infrequently.  She has F/U with them soon.  She has never tried Eucrisa.      Asthma is controlled on Advair once every morning.  She may miss it once or twice but does not notice a difference.  She has albuterol but uses it infrequently.  She is requesting a refill.  She has a nebulizer and just got the solution for it.      She takes Pepcid daily at night before bedtime but still has heartburn about 3x a week.  She admits they eat late because her , who works for the [employer], does not get home until 6:30.  She is in bed by 9 pm because she gets up at 6:00 am.  She has never had an EGD.    Patient's mother has an appointment here.    Review of Systems   Gastrointestinal:         Heartburn.   Skin:         Periorbital itching.     Objective   Pulse 75   Wt 85.3 kg (188 lb)   SpO2 99%   BMI 32.27 kg/m²      Physical Exam  Constitutional:       Appearance: Normal appearance.   HENT:      Head: Normocephalic and atraumatic.      Right Ear: External ear normal. There is no impacted cerumen.      Left Ear: External ear normal. There is no impacted cerumen.      Nose: No congestion or rhinorrhea.   Eyes:      Extraocular Movements: Extraocular movements intact.      Conjunctiva/sclera: Conjunctivae normal.      Pupils: Pupils are equal, round, and reactive to light.   Cardiovascular:      Rate and Rhythm: Normal rate and regular  rhythm.      Heart sounds: No murmur heard.     No friction rub. No gallop.   Pulmonary:      Effort: No respiratory distress.      Breath sounds: No wheezing, rhonchi or rales.   Skin:     General: Skin is warm and dry.   Neurological:      Mental Status: She is alert.   Psychiatric:         Mood and Affect: Mood normal.         Behavior: Behavior normal.     Assessment/Plan     Gastroesophageal reflux disease without esophagitis  She C/O heartburn    despite Pepcid before bedtime.  She does eat late and goes to bed early.  She has never seen GI or had an EGD.    I would like her to take Pepcid before she has dinner.  I referred her to Gastroenterology for EGD but she can cancel the appointment if the heartburn improves.    Asthma ([facility])  ACT 25.  Sx are controlled on her Advair and does not really notice if she missed it.  She uses albuterol infrequently.  She is requesting a refill.  She received her nebulizer solution for it.      She will continue Advair but decrease to every other day.     Continue albuterol as needed.    Atopic dermatitis  She has been on Dupixent a year and has had significant improvement.  She is having periorbital itching bilaterally.    She will continue Dupixent every other week.  I recommend avoiding clobetasol around eyes.  Apply Eucrisa around eye skin twice a day as needed.    By signing my name below, I, Corie Bunch, attest that this documentation has been prepared under the direction and in the presence of Mayda Beard MD.  All medical record entries made by the Scribe were at my direction and personally dictated by me. I have reviewed the chart and agree that the record accurately reflects my personal performance of the history, physical exam, discussion and plan.

## 2025-04-30 NOTE — PATIENT INSTRUCTIONS
Continue Dupixent every other week.     Continue Advair but decrease to every other day.     Continue albuterol as needed.    I recommend avoiding clobetasol around eyes.  Apply Eucrisa around eye skin twice a day as needed.    I have provided a referral to Gastroenterology for EGD and heartburn.  Cancel the appointment if the heartburn improves with the Pepcid before dinner.    Signed a release for [facility].    Follow up in a year or sooner if problems or symptoms worsen prior.

## 2025-05-01 ENCOUNTER — APPOINTMENT (OUTPATIENT)
Dept: ALLERGY | Facility: CLINIC | Age: 40
End: 2025-05-01
Payer: COMMERCIAL

## 2025-05-01 VITALS — OXYGEN SATURATION: 99 % | BODY MASS INDEX: 32.27 KG/M2 | HEART RATE: 75 BPM | WEIGHT: 188 LBS

## 2025-05-01 DIAGNOSIS — J45.40 MODERATE PERSISTENT ASTHMA WITHOUT COMPLICATION (HHS-HCC): ICD-10-CM

## 2025-05-01 DIAGNOSIS — L20.89 OTHER ATOPIC DERMATITIS: ICD-10-CM

## 2025-05-01 DIAGNOSIS — J45.909 ASTHMA, UNSPECIFIED ASTHMA SEVERITY, UNSPECIFIED WHETHER COMPLICATED, UNSPECIFIED WHETHER PERSISTENT (HHS-HCC): ICD-10-CM

## 2025-05-01 DIAGNOSIS — K21.9 GASTROESOPHAGEAL REFLUX DISEASE WITHOUT ESOPHAGITIS: ICD-10-CM

## 2025-05-01 DIAGNOSIS — H01.119 EYELID DERMATITIS, ALLERGIC/CONTACT: Primary | ICD-10-CM

## 2025-05-01 PROCEDURE — 96160 PT-FOCUSED HLTH RISK ASSMT: CPT | Performed by: ALLERGY & IMMUNOLOGY

## 2025-05-01 PROCEDURE — 99214 OFFICE O/P EST MOD 30 MIN: CPT | Performed by: ALLERGY & IMMUNOLOGY

## 2025-05-01 RX ORDER — LEVONORGESTREL 52 MG/1
INTRAUTERINE DEVICE INTRAUTERINE
COMMUNITY
Start: 2024-07-17

## 2025-05-01 RX ORDER — CRISABOROLE 20 MG/G
OINTMENT TOPICAL
Qty: 100 G | Refills: 0 | Status: SHIPPED | OUTPATIENT
Start: 2025-05-01

## 2025-05-01 RX ORDER — KETOCONAZOLE 20 MG/ML
SHAMPOO, SUSPENSION TOPICAL
COMMUNITY
Start: 2024-10-04

## 2025-05-01 RX ORDER — ALBUTEROL SULFATE 90 UG/1
2 INHALANT RESPIRATORY (INHALATION) EVERY 4 HOURS PRN
Qty: 17 G | Refills: 0 | Status: SHIPPED | OUTPATIENT
Start: 2025-05-01

## 2025-05-01 RX ORDER — METHYLPHENIDATE HYDROCHLORIDE 20 MG/1
20 TABLET ORAL
COMMUNITY
Start: 2025-03-03

## 2025-05-01 RX ORDER — ATOMOXETINE 10 MG/1
CAPSULE ORAL
COMMUNITY
Start: 2024-10-16

## 2025-05-01 RX ORDER — CLOBETASOL PROPIONATE 0.5 MG/ML
SOLUTION TOPICAL
COMMUNITY
Start: 2024-10-04

## 2025-05-01 ASSESSMENT — ENCOUNTER SYMPTOMS: ROS GI COMMENTS: HEARTBURN.

## 2025-05-01 NOTE — ASSESSMENT & PLAN NOTE
ACT 25.  Sx are controlled on her Advair and does not really notice if she missed it.  She uses albuterol infrequently.  She is requesting a refill.  She received her nebulizer solution for it.      She will continue Advair but decrease to every other day.     Continue albuterol as needed.

## 2025-05-01 NOTE — ASSESSMENT & PLAN NOTE
She C/O heartburn    despite Pepcid before bedtime.  She does eat late and goes to bed early.  She has never seen GI or had an EGD.    I would like her to take Pepcid before she has dinner.  I referred her to Gastroenterology for EGD but she can cancel the appointment if the heartburn improves.

## 2025-05-01 NOTE — ASSESSMENT & PLAN NOTE
She has been on Dupixent a year and has had significant improvement.  She is having periorbital itching bilaterally.    She will continue Dupixent every other week.  I recommend avoiding clobetasol around eyes.  Apply Eucrisa around eye skin twice a day as needed.

## 2025-05-07 ENCOUNTER — SPECIALTY PHARMACY (OUTPATIENT)
Dept: PHARMACY | Facility: CLINIC | Age: 40
End: 2025-05-07

## 2025-05-07 DIAGNOSIS — J45.909 ASTHMA, UNSPECIFIED ASTHMA SEVERITY, UNSPECIFIED WHETHER COMPLICATED, UNSPECIFIED WHETHER PERSISTENT (HHS-HCC): ICD-10-CM

## 2025-05-07 DIAGNOSIS — L20.89 OTHER ATOPIC DERMATITIS: ICD-10-CM

## 2025-05-09 ENCOUNTER — APPOINTMENT (OUTPATIENT)
Dept: PRIMARY CARE | Facility: CLINIC | Age: 40
End: 2025-05-09
Payer: COMMERCIAL

## 2025-05-09 VITALS
OXYGEN SATURATION: 95 % | SYSTOLIC BLOOD PRESSURE: 122 MMHG | WEIGHT: 191 LBS | BODY MASS INDEX: 32.61 KG/M2 | DIASTOLIC BLOOD PRESSURE: 72 MMHG | HEIGHT: 64 IN | HEART RATE: 108 BPM

## 2025-05-09 DIAGNOSIS — E66.01 MORBID OBESITY (MULTI): ICD-10-CM

## 2025-05-09 DIAGNOSIS — M62.838 MUSCLE SPASM: ICD-10-CM

## 2025-05-09 DIAGNOSIS — Z79.899 ENCOUNTER FOR LONG-TERM (CURRENT) USE OF MEDICATIONS: ICD-10-CM

## 2025-05-09 DIAGNOSIS — M54.41 ACUTE RIGHT-SIDED LOW BACK PAIN WITH RIGHT-SIDED SCIATICA: ICD-10-CM

## 2025-05-09 DIAGNOSIS — J32.4 PANSINUSITIS, UNSPECIFIED CHRONICITY: Primary | ICD-10-CM

## 2025-05-09 DIAGNOSIS — M54.30 SCIATICA, UNSPECIFIED LATERALITY: ICD-10-CM

## 2025-05-09 PROCEDURE — 3078F DIAST BP <80 MM HG: CPT | Performed by: INTERNAL MEDICINE

## 2025-05-09 PROCEDURE — 99214 OFFICE O/P EST MOD 30 MIN: CPT | Performed by: INTERNAL MEDICINE

## 2025-05-09 PROCEDURE — 3008F BODY MASS INDEX DOCD: CPT | Performed by: INTERNAL MEDICINE

## 2025-05-09 PROCEDURE — 3074F SYST BP LT 130 MM HG: CPT | Performed by: INTERNAL MEDICINE

## 2025-05-09 RX ORDER — CYCLOBENZAPRINE HCL 5 MG
TABLET ORAL
COMMUNITY
Start: 2025-05-03

## 2025-05-09 RX ORDER — IBUPROFEN 800 MG/1
800 TABLET ORAL 3 TIMES DAILY PRN
Qty: 120 TABLET | Refills: 1 | Status: SHIPPED | OUTPATIENT
Start: 2025-05-09

## 2025-05-09 RX ORDER — IBUPROFEN 600 MG/1
TABLET ORAL
COMMUNITY
Start: 2025-05-03 | End: 2025-05-09 | Stop reason: WASHOUT

## 2025-05-09 RX ORDER — TIZANIDINE 4 MG/1
TABLET ORAL
Qty: 60 TABLET | Refills: 1 | Status: SHIPPED | OUTPATIENT
Start: 2025-05-09

## 2025-05-09 ASSESSMENT — ENCOUNTER SYMPTOMS: BACK PAIN: 1

## 2025-05-09 NOTE — PROGRESS NOTES
"Date of Visit: 05/09/2025     Chief Complaint:   Chief Complaint   Patient presents with    Back Pain       HPI:  HPI  Subjective:  Eloy Taylor is a 39 y.o. female presents for annual physical.   States that she \"threw her back out\" last sat.   States that she woke up sat morning with back pain. Right lower back with rad to her butt.    Did not do anything in particular. No heavy lifting.  No fall.   States that after several hours, she had difficulty walking.  To the ed for eval.   Was given muscle relaxant, ibuprofen and lidocaine patches.    She has been to the chiropractor.  Still with low back pain.     obesity  Terzepatide 7.5mg for the past 2 mos.    Tolerating.  No side effects noted at this time.  Admits that she did eat a little more, but did not gain wt.   Still exercising.     ROS:   Review of Systems   Musculoskeletal:  Positive for back pain.         Outpatient Medications:  Current Outpatient Medications   Medication Instructions    adapalene (Differin) 0.3 % gel     Advair Diskus 250-50 mcg/dose diskus inhaler INHALE ONE PUFF BY MOUTH EVERY TWELVE HOURS    albuterol 2.5 mg /3 mL (0.083 %) nebulizer solution TAKE  3ML BY NEBULIZER EVERY 4 HOURS IF NEEDED FOR WHEEZING    albuterol 90 mcg/actuation inhaler 2 puffs, inhalation, Every 4 hours PRN    ALPRAZolam (XANAX) 0.25 mg, oral, Nightly PRN    ARIPiprazole (ABILIFY) 15 mg, oral, Daily    atomoxetine (Strattera) 10 mg capsule     atorvastatin (LIPITOR) 10 mg, oral, Daily    clobetasol (Temovate) 0.05 % external solution Apply topically.    crisaborole (Eucrisa) 2 % ointment Apply twice a day around eye for dermatitis    cyclobenzaprine (Flexeril) 5 mg tablet     dupilumab (Dupixent) 300 mg/2 mL prefilled syringe Inject 300 mg sub cu every 14 days.    famotidine (PEPCID) 20 mg, Daily    ketoconazole (NIZOral) 2 % shampoo Apply topically.    lisinopriL-hydrochlorothiazide 10-12.5 mg tablet 1 tablet, oral, Daily    methylphenidate (RITALIN) 20 mg    " Mirena 20 mcg/24hr IUD     PARoxetine CR (PAXIL-CR) 37.5 mg, oral, Every morning    tirzepatide (weight loss) (ZEPBOUND) 10 mg, subcutaneous, Every 7 days    triamcinolone (Kenalog) 0.1 % cream     triamcinolone (Nasacort) 55 mcg nasal inhaler 2 sprays, Daily       Allergies:  RX Allergies[1]    Medical History[2]     Health Maintenance   Topic Date Due    Varicella Vaccines (1 of 2 - 13+ 2-dose series) Never done    Hepatitis B Vaccines (1 of 3 - 19+ 3-dose series) Never done    Pneumococcal Vaccine: Pediatrics and At-Risk Adult Patients (2 of 2 - PCV) 02/19/2012    DTaP/Tdap/Td Vaccines (8 - Tdap) 02/19/2019    COVID-19 Vaccine (3 - 2024-25 season) 09/01/2024    Influenza Vaccine (Season Ended) 09/01/2025    Yearly Adult Physical  12/07/2025    Mammogram  01/22/2026    Diabetes Screening  03/24/2026    Cervical Cancer Screening  07/17/2027    Lipid Panel  03/24/2030    Zoster Vaccines (1 of 2) 11/30/2033    IPV Vaccines  Completed    MMR Vaccines  Completed    HIV Screening  Completed    Hepatitis C Screening  Completed    HIB Vaccines  Aged Out    Hepatitis A Vaccines  Aged Out    Meningococcal Vaccine  Aged Out    Rotavirus Vaccines  Aged Out    HPV Vaccines  Aged Out        Immunization History   Administered Date(s) Administered    COVID-19, mRNA, LNP-S, PF, 30 mcg/0.3 mL dose 11/15/2021    DT (pediatric) 08/10/1998    DTaP vaccine, pediatric  (INFANRIX) 02/09/1984, 04/13/1984, 09/07/1984, 05/04/1985, 07/05/1989    Flu vaccine, trivalent, preservative free, HIGH-DOSE, age 65y+ (Fluzone) 10/15/2019    Influenza, Unspecified 10/08/2004, 10/24/2006    MMR vaccine, subcutaneous (MMR II) 02/23/1985, 09/30/1995    PPD Test 08/10/1988, 11/29/1988, 11/10/1989, 11/29/1989, 08/09/1991    Pfizer Purple Cap SARS-CoV-2 03/17/2021, 04/07/2021    Pneumococcal polysaccharide vaccine, 23-valent, age 2 years and older (PNEUMOVAX 23) 02/19/2011    Poliovirus vaccine, subcutaneous (IPOL) 02/09/1984, 04/13/1984, 05/04/1985,  "08/22/1989    Td (adult) 02/19/2009        Surgical History[3]     Family History[4]     Social History[5]     Vitals:  /72 (BP Location: Left arm, Patient Position: Sitting, BP Cuff Size: Adult)   Pulse 108   Ht 1.626 m (5' 4\")   Wt 86.6 kg (191 lb)   SpO2 95%   BMI 32.79 kg/m²   BP Readings from Last 3 Encounters:   05/09/25 122/72   04/11/25 118/80   03/24/25 124/78      Wt Readings from Last 3 Encounters:   05/09/25 86.6 kg (191 lb)   05/01/25 85.3 kg (188 lb)   04/11/25 86.7 kg (191 lb 3.2 oz)       Physical Exam:  Physical Exam  Vitals reviewed.   Constitutional:       Appearance: Normal appearance.   HENT:      Head: Normocephalic and atraumatic.   Cardiovascular:      Rate and Rhythm: Normal rate and regular rhythm.      Heart sounds: Normal heart sounds, S1 normal and S2 normal. No murmur heard.  Pulmonary:      Effort: Pulmonary effort is normal.      Breath sounds: Normal breath sounds. No wheezing, rhonchi or rales.   Abdominal:      General: Bowel sounds are normal.      Palpations: Abdomen is soft.   Musculoskeletal:      Lumbar back: Spasms and tenderness present. No swelling. Decreased range of motion.        Back:       Comments: Tender to palp of the right lower back in the lumbar region with rad to medial right gluteus.  +muscle spasm palp'd.     Skin:     General: Skin is warm and dry.   Neurological:      General: No focal deficit present.      Mental Status: She is alert and oriented to person, place, and time.           Assessment/Plan   Diagnoses and all orders for this visit:  Pansinusitis, unspecified chronicity  Muscle spasm  Sciatica, unspecified laterality  Acute right-sided low back pain with right-sided sciatica  Encounter for long-term (current) use of medications  Morbid obesity (Multi)        Orders:  No orders of the defined types were placed in this encounter.       Followup Appts:  Future Appointments   Date Time Provider Department Center   6/9/2025 11:40 AM Jens RAMSEY" "MD [id number] ibup to 800mg,      ____________________________________________________________  Jens RAMSEY MD [place]       [1]   Allergies  Allergen Reactions    Aspirin Shortness of breath     Worsens her asthma    Doxylamin-Pse-Dm-Acetaminophen Anaphylaxis    Latex Itching    Nut - Unspecified Unknown     tree nuts    Animal Dander Unknown    Azithromycin Nausea/vomiting    Codeine Nausea And Vomiting and Nausea/vomiting    Hydrocodone-Acetaminophen GI Upset    Milk Rash    Sertraline Itching and Rash    Sumatriptan Unknown     Other reaction(s): Other: See Comments   Pt gets all side effect listed with medication    Pt gets all side effect listed with medication    Tramadol Dizziness     Other reaction(s): Other: See Comments   \"Drunk\" feeling    \"Drunk\" feeling   [2]   Past Medical History:  Diagnosis Date    ADHD (attention deficit hyperactivity disorder)     Allergic     Anxiety     Asthma     Eczema     Encounter for fertility testing 01/14/2018    Fertility testing    Encounter for other general counseling and advice on procreation 10/26/2017    Encounter for preconception consultation    Headache, unspecified     Bad headache    Hypertension     Maternal care for (suspected) hereditary disease in fetus, not applicable or unspecified ([facility]) 10/26/2017    Hereditary disease in family possibly affecting fetus    Other allergy status, other than to drugs and biological substances     History of environmental allergies    Other specified health status 08/31/2018    Contraception    Other specified health status 08/31/2018    Contraception    Other specified postprocedural states 01/19/2018    History of in vitro fertilization    Pain in unspecified knee 02/21/2019    Chronic knee pain    Pain in unspecified shoulder 12/22/2016    Acromioclavicular pain    Personal history of other diseases of the circulatory system 12/28/2015    History of essential hypertension    Personal history of " other diseases of the circulatory system     History of hypertension    Personal history of other diseases of the nervous system and sense organs 05/16/2017    History of migraine    Personal history of other diseases of the respiratory system     History of asthma    Personal history of other mental and behavioral disorders 10/25/2017    History of anxiety disorder    Recurrent pregnancy loss 10/09/2017    Recurrent pregnancy loss without current pregnancy   [3]   Past Surgical History:  Procedure Laterality Date    DILATION AND CURETTAGE OF UTERUS  06/29/2015    Dilation And Curettage    MOUTH SURGERY  12/07/2015    Oral Surgery Tooth Extraction    OTHER SURGICAL HISTORY  01/04/2023    Elbow surgery    SINUS SURGERY     [4]   Family History  Problem Relation Name Age of Onset    Ovarian cancer Paternal Grandmother Edith Panchal    [5]   Social History  Socioeconomic History    Marital status:    Occupational History    Occupation: construction   Tobacco Use    Smoking status: Never    Smokeless tobacco: Never   Vaping Use    Vaping status: Never Used   Substance and Sexual Activity    Alcohol use: Not Currently    Drug use: Not Currently     Types: Marijuana    Sexual activity: Yes     Partners: Male     Birth control/protection: I.U.D.

## 2025-05-12 DIAGNOSIS — L20.89 OTHER ATOPIC DERMATITIS: ICD-10-CM

## 2025-05-12 DIAGNOSIS — J45.909 ASTHMA, UNSPECIFIED ASTHMA SEVERITY, UNSPECIFIED WHETHER COMPLICATED, UNSPECIFIED WHETHER PERSISTENT (HHS-HCC): ICD-10-CM

## 2025-05-13 DIAGNOSIS — J45.909 ASTHMA, UNSPECIFIED ASTHMA SEVERITY, UNSPECIFIED WHETHER COMPLICATED, UNSPECIFIED WHETHER PERSISTENT (HHS-HCC): ICD-10-CM

## 2025-05-13 DIAGNOSIS — L20.89 OTHER ATOPIC DERMATITIS: ICD-10-CM

## 2025-05-23 ENCOUNTER — PATIENT OUTREACH (OUTPATIENT)
Dept: CARE COORDINATION | Facility: CLINIC | Age: 40
End: 2025-05-23
Payer: COMMERCIAL

## 2025-05-23 NOTE — PROGRESS NOTES
Outreach call to Eloy regarding nutrition referral. We were able to schedule a visit for next month.

## 2025-05-28 ENCOUNTER — SPECIALTY PHARMACY (OUTPATIENT)
Dept: PHARMACY | Facility: CLINIC | Age: 40
End: 2025-05-28

## 2025-05-28 PROCEDURE — RXMED WILLOW AMBULATORY MEDICATION CHARGE

## 2025-05-29 ENCOUNTER — PHARMACY VISIT (OUTPATIENT)
Dept: PHARMACY | Facility: CLINIC | Age: 40
End: 2025-05-29
Payer: COMMERCIAL

## 2025-06-02 ENCOUNTER — SPECIALTY PHARMACY (OUTPATIENT)
Dept: PHARMACY | Facility: CLINIC | Age: 40
End: 2025-06-02

## 2025-06-09 ENCOUNTER — APPOINTMENT (OUTPATIENT)
Dept: PRIMARY CARE | Facility: CLINIC | Age: 40
End: 2025-06-09
Payer: COMMERCIAL

## 2025-06-09 VITALS
OXYGEN SATURATION: 96 % | TEMPERATURE: 97.1 F | HEIGHT: 64 IN | HEART RATE: 85 BPM | DIASTOLIC BLOOD PRESSURE: 72 MMHG | BODY MASS INDEX: 31.38 KG/M2 | WEIGHT: 183.8 LBS | SYSTOLIC BLOOD PRESSURE: 120 MMHG

## 2025-06-09 DIAGNOSIS — R82.81 PYURIA: ICD-10-CM

## 2025-06-09 DIAGNOSIS — E66.01 MORBID OBESITY DUE TO EXCESS CALORIES (MULTI): Primary | ICD-10-CM

## 2025-06-09 DIAGNOSIS — R35.0 URINARY FREQUENCY: ICD-10-CM

## 2025-06-09 DIAGNOSIS — R30.0 DYSURIA: ICD-10-CM

## 2025-06-09 DIAGNOSIS — I10 ESSENTIAL (PRIMARY) HYPERTENSION: ICD-10-CM

## 2025-06-09 DIAGNOSIS — N39.0 URINARY TRACT INFECTION WITHOUT HEMATURIA, SITE UNSPECIFIED: ICD-10-CM

## 2025-06-09 DIAGNOSIS — M79.602 PAIN IN LEFT ARM: ICD-10-CM

## 2025-06-09 DIAGNOSIS — Z79.899 ENCOUNTER FOR LONG-TERM (CURRENT) USE OF MEDICATIONS: ICD-10-CM

## 2025-06-09 LAB
POC APPEARANCE, URINE: CLEAR
POC BILIRUBIN, URINE: NEGATIVE
POC BLOOD, URINE: NEGATIVE
POC COLOR, URINE: YELLOW
POC FINGERSTICK BLOOD GLUCOSE: 128 MG/DL (ref 70–100)
POC GLUCOSE, URINE: NEGATIVE MG/DL
POC HEMOGLOBIN A1C: 5.1 % (ref 4.2–6.5)
POC KETONES, URINE: NEGATIVE MG/DL
POC LEUKOCYTES, URINE: ABNORMAL
POC NITRITE,URINE: NEGATIVE
POC PH, URINE: 6.5 PH
POC PROTEIN, URINE: NEGATIVE MG/DL
POC SPECIFIC GRAVITY, URINE: 1.01
POC UROBILINOGEN, URINE: 0.2 EU/DL

## 2025-06-09 PROCEDURE — 3008F BODY MASS INDEX DOCD: CPT | Performed by: INTERNAL MEDICINE

## 2025-06-09 PROCEDURE — 3078F DIAST BP <80 MM HG: CPT | Performed by: INTERNAL MEDICINE

## 2025-06-09 PROCEDURE — 81002 URINALYSIS NONAUTO W/O SCOPE: CPT | Performed by: INTERNAL MEDICINE

## 2025-06-09 PROCEDURE — 3074F SYST BP LT 130 MM HG: CPT | Performed by: INTERNAL MEDICINE

## 2025-06-09 PROCEDURE — 82962 GLUCOSE BLOOD TEST: CPT | Performed by: INTERNAL MEDICINE

## 2025-06-09 PROCEDURE — 99214 OFFICE O/P EST MOD 30 MIN: CPT | Performed by: INTERNAL MEDICINE

## 2025-06-09 PROCEDURE — 83036 HEMOGLOBIN GLYCOSYLATED A1C: CPT | Performed by: INTERNAL MEDICINE

## 2025-06-09 RX ORDER — LISINOPRIL AND HYDROCHLOROTHIAZIDE 10; 12.5 MG/1; MG/1
1 TABLET ORAL DAILY
Qty: 90 TABLET | Refills: 1 | Status: SHIPPED | OUTPATIENT
Start: 2025-06-09

## 2025-06-09 RX ORDER — CIPROFLOXACIN 500 MG/1
500 TABLET, FILM COATED ORAL 2 TIMES DAILY
Qty: 10 TABLET | Refills: 0 | Status: SHIPPED | OUTPATIENT
Start: 2025-06-09 | End: 2025-06-14

## 2025-06-09 NOTE — PROGRESS NOTES
Date of Visit: 06/09/2025     Chief Complaint:   Chief Complaint   Patient presents with    Weight Check     1 mo f/u    UTI     Frequency and burning    Arm Pain     Left arm pain       HPI:  HPI  Subjective:   Eloy Taylor is a 39 y.o. female presents   Since Friday dysuria and freq.  Urine is foul smelling  No blood.   No fever.     obesity  Since increasing to the zepbound 10mg, she has noted some mild nausea.  Did not last long.  Tolerable.  Dwp that increasing dose may lead to increased nausea.  Decided to stay at current dose for the next month or two.     Left lat arm pain.    For the past 3mos.    She denies accident/injury.   No numbness or tingling.     ROS:   Review of Systems       Outpatient Medications:  Current Outpatient Medications   Medication Instructions    adapalene (Differin) 0.3 % gel     Advair Diskus 250-50 mcg/dose diskus inhaler INHALE ONE PUFF BY MOUTH EVERY TWELVE HOURS    albuterol 2.5 mg /3 mL (0.083 %) nebulizer solution TAKE  3ML BY NEBULIZER EVERY 4 HOURS IF NEEDED FOR WHEEZING    albuterol 90 mcg/actuation inhaler 2 puffs, inhalation, Every 4 hours PRN    ALPRAZolam (XANAX) 0.25 mg, oral, Nightly PRN    ARIPiprazole (ABILIFY) 15 mg, oral, Daily    atomoxetine (Strattera) 10 mg capsule     atorvastatin (LIPITOR) 10 mg, oral, Daily    clobetasol (Temovate) 0.05 % external solution Apply topically.    crisaborole (Eucrisa) 2 % ointment Apply twice a day around eye for dermatitis    cyclobenzaprine (Flexeril) 5 mg tablet     dupilumab (Dupixent) 300 mg/2 mL prefilled syringe Inject 1 syringe (300 mg) under the skin every 14 days.    famotidine (PEPCID) 20 mg, Daily    ibuprofen 800 mg, oral, 3 times daily PRN    ketoconazole (NIZOral) 2 % shampoo Apply topically.    lisinopriL-hydrochlorothiazide 10-12.5 mg tablet 1 tablet, oral, Daily    methylphenidate (RITALIN) 20 mg    Mirena 20 mcg/24hr IUD     PARoxetine CR (PAXIL-CR) 37.5 mg, oral, Every morning    tirzepatide (weight  "loss) (ZEPBOUND) 10 mg, subcutaneous, Every 7 days    tiZANidine (Zanaflex) 4 mg tablet 1/2-1 tablet tid prn    triamcinolone (Kenalog) 0.1 % cream     triamcinolone (Nasacort) 55 mcg nasal inhaler 2 sprays, Daily       Allergies:  RX Allergies[1]    Medical History[2]     Health Maintenance   Topic Date Due    Varicella Vaccines (1 of 2 - 13+ 2-dose series) Never done    Hepatitis B Vaccines (1 of 3 - 19+ 3-dose series) Never done    Pneumococcal Vaccine: Pediatrics and At-Risk Adult Patients (2 of 2 - PCV) 02/19/2012    DTaP/Tdap/Td Vaccines (8 - Tdap) 02/19/2019    COVID-19 Vaccine (3 - 2024-25 season) 09/01/2024    Influenza Vaccine (Season Ended) 09/01/2025    Yearly Adult Physical  12/07/2025    Mammogram  01/22/2026    Diabetes Screening  06/09/2026    Cervical Cancer Screening  07/17/2027    Lipid Panel  03/24/2030    Zoster Vaccines (1 of 2) 11/30/2033    IPV Vaccines  Completed    MMR Vaccines  Completed    HIV Screening  Completed    Hepatitis C Screening  Completed    HIB Vaccines  Aged Out    Hepatitis A Vaccines  Aged Out    Meningococcal Vaccine  Aged Out    Rotavirus Vaccines  Aged Out    HPV Vaccines  Aged Out       Surgical History[3]    Family History[4]      Social History[5]       Vitals:  /72 (BP Location: Left arm, Patient Position: Sitting, BP Cuff Size: Adult)   Pulse 85   Temp 36.2 °C (97.1 °F) (Temporal)   Ht 1.626 m (5' 4\")   Wt 83.4 kg (183 lb 12.8 oz)   SpO2 96%   BMI 31.55 kg/m²   Wt Readings from Last 3 Encounters:   06/09/25 83.4 kg (183 lb 12.8 oz)   05/09/25 86.6 kg (191 lb)   05/01/25 85.3 kg (188 lb)     BP Readings from Last 3 Encounters:   06/09/25 120/72   05/09/25 122/72   04/11/25 118/80        Physical Exam:  Physical Exam  Vitals reviewed.   Constitutional:       Appearance: Normal appearance.   HENT:      Head: Normocephalic and atraumatic.   Cardiovascular:      Rate and Rhythm: Normal rate and regular rhythm.      Heart sounds: Normal heart sounds, S1 normal " and S2 normal. No murmur heard.  Pulmonary:      Effort: Pulmonary effort is normal.      Breath sounds: Normal breath sounds. No wheezing, rhonchi or rales.   Abdominal:      General: Bowel sounds are normal.      Palpations: Abdomen is soft.   Skin:     General: Skin is warm and dry.   Neurological:      General: No focal deficit present.      Mental Status: She is alert and oriented to person, place, and time.          Assessment/Plan   Diagnoses and all orders for this visit:  Morbid obesity due to excess calories (Multi)  -     POCT fingerstick glucose manually resulted  -     POCT glycosylated hemoglobin (Hb A1C) manually resulted  Pyuria  Dysuria  -     ciprofloxacin (Cipro) 500 mg tablet; Take 1 tablet (500 mg) by mouth 2 times a day for 5 days.  -     Urine Culture  -     POCT UA (nonautomated) manually resulted  Urinary frequency  -     ciprofloxacin (Cipro) 500 mg tablet; Take 1 tablet (500 mg) by mouth 2 times a day for 5 days.  -     Urine Culture  -     POCT UA (nonautomated) manually resulted  Urinary tract infection without hematuria, site unspecified  -     Urine Culture  Encounter for long-term (current) use of medications  -     POCT fingerstick glucose manually resulted  -     POCT glycosylated hemoglobin (Hb A1C) manually resulted  -     tirzepatide, weight loss, (Zepbound) 10 mg/0.5 mL injection; Inject 10 mg under the skin every 7 days.  Pain in left arm  -     Referral to Physical Therapy; Future  Essential (primary) hypertension  -     lisinopriL-hydrochlorothiazide 10-12.5 mg tablet; Take 1 tablet by mouth once daily.      Orders:  Orders Placed This Encounter   Procedures    Urine Culture    Referral to Physical Therapy    POCT fingerstick glucose manually resulted    POCT glycosylated hemoglobin (Hb A1C) manually resulted    POCT UA (nonautomated) manually resulted        Followup Appts:  Future Appointments   Date Time Provider Department Center   7/11/2025  9:00 AM Gita Sanchez RD  [email or web address]   8/11/2025 11:40 AM Jens Mendez MD [id number]           ____________________________________________________________  Jens Mendez MD       [1]   Allergies  Allergen Reactions    Aspirin Shortness of breath     Worsens her asthma    Doxylamin-Pse-Dm-Acetaminophen Anaphylaxis    Latex Itching    Nut - Unspecified Unknown     tree nuts    Animal Dander Unknown    Azithromycin Nausea/vomiting    Codeine Nausea And Vomiting and Nausea/vomiting    Hydrocodone-Acetaminophen GI Upset    Milk Rash    Sertraline Itching and Rash    Sumatriptan Unknown     Other reaction(s): Other: See Comments   Pt gets all side effect listed with medication    Pt gets all side effect listed with medication    Tramadol Dizziness     Other reaction(s): Other: See Comments   \"Drunk\" feeling    \"Drunk\" feeling   [2]   Past Medical History:  Diagnosis Date    ADHD (attention deficit hyperactivity disorder)     Allergic     Anxiety     Asthma     Eczema     Encounter for fertility testing 01/14/2018    Fertility testing    Encounter for other general counseling and advice on procreation 10/26/2017    Encounter for preconception consultation    Headache, unspecified     Bad headache    Hypertension     Maternal care for (suspected) hereditary disease in fetus, not applicable or unspecified ([facility]) 10/26/2017    Hereditary disease in family possibly affecting fetus    Other allergy status, other than to drugs and biological substances     History of environmental allergies    Other specified health status 08/31/2018    Contraception    Other specified health status 08/31/2018    Contraception    Other specified postprocedural states 01/19/2018    History of in vitro fertilization    Pain in unspecified knee 02/21/2019    Chronic knee pain    Pain in unspecified shoulder 12/22/2016    Acromioclavicular pain    Personal history of other diseases of the circulatory system 12/28/2015    History of essential hypertension "    Personal history of other diseases of the circulatory system     History of hypertension    Personal history of other diseases of the nervous system and sense organs 05/16/2017    History of migraine    Personal history of other diseases of the respiratory system     History of asthma    Personal history of other mental and behavioral disorders 10/25/2017    History of anxiety disorder    Recurrent pregnancy loss 10/09/2017    Recurrent pregnancy loss without current pregnancy   [3]   Past Surgical History:  Procedure Laterality Date    DILATION AND CURETTAGE OF UTERUS  06/29/2015    Dilation And Curettage    MOUTH SURGERY  12/07/2015    Oral Surgery Tooth Extraction    OTHER SURGICAL HISTORY  01/04/2023    Elbow surgery    SINUS SURGERY     [4]   Family History  Problem Relation Name Age of Onset    Ovarian cancer Paternal Grandmother Edith Panchal    [5]   Social History  Socioeconomic History    Marital status:    Occupational History    Occupation: construction   Tobacco Use    Smoking status: Never    Smokeless tobacco: Never   Vaping Use    Vaping status: Never Used   Substance and Sexual Activity    Alcohol use: Not Currently    Drug use: Not Currently     Types: Marijuana    Sexual activity: Yes     Partners: Male     Birth control/protection: I.U.D.

## 2025-06-12 LAB — BACTERIA UR CULT: ABNORMAL

## 2025-06-13 ENCOUNTER — TELEMEDICINE CLINICAL SUPPORT (OUTPATIENT)
Dept: CARE COORDINATION | Facility: CLINIC | Age: 40
End: 2025-06-13
Payer: COMMERCIAL

## 2025-06-13 NOTE — PROGRESS NOTES
INITIAL NUTRITION EDUCATION VISIT    Reason for Nutrition Visit:  Pt is a 39 y.o. female being seen Virtual referred for weight management    Past Medical Hx:  Problem List[1]     Current Medications:   Medications Ordered Prior to Encounter[2]    Food & Nutrition Related History:  Dietary Considerations: allergies and aversions has limited options  Food Allergies: Treenuts, Milk, and Eggs  Intolerance: None  Appetite: Poor to okay but feels not eating enough  GI Symptoms : nausea and vomiting  Food Preparation: Patient and Partner/Spouse  Cooking Skills/Barriers: None reported  Grocery Shopping: Patient and Partner/Spouse  No difficulty affording food  Supplements: Multivitamin and Vitamin D daily    24 Diet Recall:  Meal 1:  skip - coffee with creamer almond milk or powdered  Meal 2:  chicken susi burger, pear  Meal 3: 1.5 pieces pizza white sauce with olives peppers tomatoes    Snacks:  popcorn makes at home little bit butter  Beverages: water, coffee  Eating out:  2-3 + times a week/ Hungarian, Grenadian, chinese, sushi, some fast food 2x month  Alcohol Intake:  none  Self-Identified Challenges: getting good nutrition with food allergies/aversions, getting enough protein    Physical Activity:   Types of Activities: Mostly Sedentary        Labs:  Lab Results   Component Value Date    HGBA1C 5.1 06/09/2025    HGBA1C 5.2 03/24/2025    HGBA1C 5.3 12/06/2024     03/24/2025    K 4.1 03/24/2025     03/24/2025    CO2 23 03/24/2025    BUN 9 03/24/2025    CREATININE 0.98 03/24/2025    CALCIUM 9.3 03/24/2025    ALBUMIN 4.5 03/24/2025    PROT 6.7 03/24/2025    BILITOT 0.7 03/24/2025    ALKPHOS 44 03/24/2025    ALT 18 03/24/2025    AST 16 03/24/2025    GLUCOSE 101 03/24/2025     Lab Results   Component Value Date    CHOL 150 03/24/2025    LDLCALC 78 03/24/2025    TRIG 201 (H) 03/24/2025    HDL 42 (L) 03/24/2025    LDLF 119 (H) 10/17/2022        Comments:   did not discuss labs today  CMP:    Lipid panel:    Vitamin D:   "  A1C:      Anthropometrics:   Ht Readings from Last 1 Encounters:   06/09/25 1.626 m (5' 4\")      BMI Readings from Last 1 Encounters:   06/09/25 31.55 kg/m²      Wt Readings from Last 10 Encounters:   06/09/25 83.4 kg (183 lb 12.8 oz)   05/09/25 86.6 kg (191 lb)   05/01/25 85.3 kg (188 lb)   04/11/25 86.7 kg (191 lb 3.2 oz)   03/24/25 88.5 kg (195 lb)   02/24/25 89.8 kg (198 lb)   01/15/25 93.5 kg (206 lb 3.2 oz)   12/06/24 90.7 kg (200 lb)   12/20/23 83.5 kg (184 lb)   06/01/23 83.1 kg (183 lb 2 oz)      Weight change:  eloy has lost 25 pounds since starting Zepbound in January      Visit Summary   Eloy shared that she feels as though she may not be getting enough nutrition or eating enough healthy foods due to the appetite suppression with Zepbound and her food allergies/aversions which limit options. She started Zepbound for weight loss back in January and is tolerating it well with occasional nausea which she says is manageable. She does note if she overeats, she vomits. She says she has always had a slow stomach so she is used to being careful about overeating. Eloy is happy with the weight loss, but doesn't notice any difference in energy levels. She is not active now and we talked about need to ensure adequate protein throughout the day and do some strength training to preserve lean mass while losing weight. She likes lifting and used to do that. Encouraged her to look into that again and for now she will start with walking her dogs. We talked about tuning into her body and ensuring she gives it fuel when she notices she is hungry vs waiting too long then overeating/feeling sick. She avoids sweets as she tends to overeat them. Went over the hunger/fullness tool and sent that to her email along with options for allergen free protein drinks, bars, and recipe site for quick and easy meals. Went over plant based protein options and higher protein pasta as she likes pasta. She is familiar with the healthy " plate and does like some vegetables that she can quickly add to a meal. Suggested she try logging in MF for 3-5 days to see what her calorie intake is and if she is under-eating. She is familiar with the site and this will give us an idea on where she is at calorie/protein-wise.       Nutrition Diagnosis:  Diagnosis Statement 1:  Diagnosis Status: New  Diagnosis : Unbalanced Diet Pattern concern of patient related to weight loss medication and food allergies/aversions as evidenced by potential undereating/inadequate nutritional intake      Nutrition Interventions:  Provided nutrition education on the following topic(s): Increased Protein and Plate Method using ACONutritionCounseling: Motivational Interviewing  Provided handouts on: hunger/fullness scale, allergen friendly protein bars, shakes, powders for times when she is not hungry or needs quick snack at work    Nutrition Goals:  Nutrition Goals : Consistent meal/snack pattern  Increase awareness and respond to hunger cues  Increase awareness and respond to satiety cues  Initiate Exercise Regimen  Lab values within normal limits  Continue limit sweets   Try different protein shakes and bars for times when you are not as hungry to avoid skipping a meal  Tune in to your body's cues for hunger/fullness  Ensure you have vegetables /use plate method when choosing out food or cooking at home  Start walking with the dogs/look into doing some strength training safely       Follow up Plan  Will follow-up x 1 mo          [1]   Patient Active Problem List  Diagnosis    Attention deficit hyperactivity disorder, combined type    HLA B27 positive    MTHFR mutation    Vitamin D deficiency    Moderate recurrent major depression    Migraine aura without headache    Hypertension    HPV (human papilloma virus) infection    Atopic dermatitis    Deviated nasal septum    Anxiety state    Allergy to nuts    Allergic rhinitis due to pollen    Acne vulgaris    Mixed hyperlipidemia     NICOLE (obstructive sleep apnea)    Essential (primary) hypertension    Anxiety    Attention deficit hyperactivity disorder (ADHD), combined type    Moderate episode of recurrent major depressive disorder    Class 1 obesity due to excess calories with serious comorbidity and body mass index (BMI) of 31.0 to 31.9 in adult    Asthma    Annual physical exam    Attention deficit hyperactivity disorder (ADHD)    Morbid obesity (Multi)    Morbid (severe) obesity due to excess calories (Multi)    Morbid obesity due to excess calories (Multi)    Encounter for long-term (current) use of medications    Gastroesophageal reflux disease without esophagitis   [2]   Current Outpatient Medications on File Prior to Visit   Medication Sig Dispense Refill    adapalene (Differin) 0.3 % gel       Advair Diskus 250-50 mcg/dose diskus inhaler INHALE ONE PUFF BY MOUTH EVERY TWELVE HOURS 60 each 1    albuterol 2.5 mg /3 mL (0.083 %) nebulizer solution TAKE  3ML BY NEBULIZER EVERY 4 HOURS IF NEEDED FOR WHEEZING 75 mL 0    albuterol 90 mcg/actuation inhaler Inhale 2 puffs every 4 hours if needed for wheezing. 17 g 0    ALPRAZolam (Xanax) 0.25 mg tablet Take 1 tablet (0.25 mg) by mouth as needed at bedtime for anxiety.      ARIPiprazole (Abilify) 15 mg tablet Take 1 tablet (15 mg) by mouth once daily.      atomoxetine (Strattera) 10 mg capsule  (Patient not taking: Reported on 6/9/2025)      atorvastatin (Lipitor) 10 mg tablet Take 1 tablet (10 mg) by mouth once daily. 100 tablet 1    ciprofloxacin (Cipro) 500 mg tablet Take 1 tablet (500 mg) by mouth 2 times a day for 5 days. 10 tablet 0    clobetasol (Temovate) 0.05 % external solution Apply topically.      crisaborole (Eucrisa) 2 % ointment Apply twice a day around eye for dermatitis 100 g 0    cyclobenzaprine (Flexeril) 5 mg tablet       dupilumab (Dupixent) 300 mg/2 mL prefilled syringe Inject 1 syringe (300 mg) under the skin every 14 days. 2 each 11    famotidine (Pepcid) 20 mg tablet Take  1 tablet (20 mg) by mouth once daily.      ibuprofen 800 mg tablet Take 1 tablet (800 mg) by mouth 3 times a day as needed for mild pain (1 - 3) (pain). 120 tablet 1    ketoconazole (NIZOral) 2 % shampoo Apply topically.      lisinopriL-hydrochlorothiazide 10-12.5 mg tablet Take 1 tablet by mouth once daily. 90 tablet 1    methylphenidate (Ritalin) 20 mg tablet Take 1 tablet (20 mg) by mouth.      Mirena 20 mcg/24hr IUD       PARoxetine CR (Paxil-CR) 37.5 mg 24 hr tablet Take 1 tablet (37.5 mg) by mouth once daily in the morning.      tirzepatide, weight loss, (Zepbound) 10 mg/0.5 mL injection Inject 10 mg under the skin every 7 days. 4 each 1    tiZANidine (Zanaflex) 4 mg tablet 1/2-1 tablet tid prn 60 tablet 1    triamcinolone (Kenalog) 0.1 % cream       triamcinolone (Nasacort) 55 mcg nasal inhaler Administer 2 sprays into each nostril once daily.      [DISCONTINUED] lisinopriL-hydrochlorothiazide 10-12.5 mg tablet TAKE 1 TABLET BY MOUTH ONCE DAILY 100 tablet 1    [DISCONTINUED] tirzepatide, weight loss, (Zepbound) 10 mg/0.5 mL injection Inject 10 mg under the skin every 7 days. 4 each 0     No current facility-administered medications on file prior to visit.

## 2025-06-18 PROBLEM — R35.0 URINARY FREQUENCY: Status: ACTIVE | Noted: 2025-06-18

## 2025-06-18 PROBLEM — R82.81 PYURIA: Status: ACTIVE | Noted: 2025-06-18

## 2025-06-18 PROBLEM — R30.0 DYSURIA: Status: ACTIVE | Noted: 2025-06-18

## 2025-06-18 PROBLEM — N39.0 URINARY TRACT INFECTION WITHOUT HEMATURIA: Status: ACTIVE | Noted: 2025-06-18

## 2025-06-23 ENCOUNTER — SPECIALTY PHARMACY (OUTPATIENT)
Dept: PHARMACY | Facility: CLINIC | Age: 40
End: 2025-06-23

## 2025-07-11 ENCOUNTER — APPOINTMENT (OUTPATIENT)
Dept: CARE COORDINATION | Facility: CLINIC | Age: 40
End: 2025-07-11
Payer: COMMERCIAL

## 2025-07-11 NOTE — PROGRESS NOTES
"Nutrition Follow-up   Dietitian 1 mo  follow-up. Pt is being seen Virtual for weight management    Labs  Lab Results   Component Value Date    HGBA1C 5.1 06/09/2025    HGBA1C 5.2 03/24/2025    HGBA1C 5.3 12/06/2024     03/24/2025    K 4.1 03/24/2025     03/24/2025    CO2 23 03/24/2025    BUN 9 03/24/2025    CREATININE 0.98 03/24/2025    CALCIUM 9.3 03/24/2025    ALBUMIN 4.5 03/24/2025    PROT 6.7 03/24/2025    BILITOT 0.7 03/24/2025    ALKPHOS 44 03/24/2025    ALT 18 03/24/2025    AST 16 03/24/2025    GLUCOSE 101 03/24/2025       Lab Results   Component Value Date    CHOL 150 03/24/2025    LDLCALC 78 03/24/2025    TRIG 201 (H) 03/24/2025    HDL 42 (L) 03/24/2025    LDLF 119 (H) 10/17/2022          Comments:     CMP:    Lipid panel:    Vitamin D:    A1C:      Anthropometrics  Ht Readings from Last 1 Encounters:   06/09/25 1.626 m (5' 4\")       BMI Readings from Last 1 Encounters:   06/09/25 31.55 kg/m²       Wt Readings from Last 10 Encounters:   06/09/25 83.4 kg (183 lb 12.8 oz)   05/09/25 86.6 kg (191 lb)   05/01/25 85.3 kg (188 lb)   04/11/25 86.7 kg (191 lb 3.2 oz)   03/24/25 88.5 kg (195 lb)   02/24/25 89.8 kg (198 lb)   01/15/25 93.5 kg (206 lb 3.2 oz)   12/06/24 90.7 kg (200 lb)   12/20/23 83.5 kg (184 lb)   06/01/23 83.1 kg (183 lb 2 oz)       Weight change:  Eloy has lost about 25-28 pounds since January on the Zepbound. She is losing gradually which is ideal.    Visit Summary  Eloy states she has been more mindful about what choices she is making for meals. She has not been able to reduce eating out as much yet. She is walking the dogs regularly which was one of her goals. She did track her intake using the Aura ring and it shows where she is running low or too high on various nutrients in a meal. Based on the meals she had the last several days, she is choosing more balanced options, but still not always getting over the 1200 calorie mariela as a bare minimum. She is eating 2 meals and finds she " forgets to take the protein shake she bought after our last call in the morning. Goals for now are to take that shake and pair it with a fruit if possible, start using the rower a few days a week to build in some upper body strength, continue the walking for lower body strength, continue to be mindful about food choices whether eating in or out choosing nutrient dense foods to maximize nutrition with reduced intake due to Zepbound. Continue steady weight loss.      Nutrition Diagnosis:  Diagnosis Statement 1:  Diagnosis Status: New -progressing  Diagnosis : Unbalanced Diet Pattern concern of patient related to weight loss medication and food allergies/aversions as evidenced by potential undereating/inadequate nutritional intake    Nutrition Goals    Try the protein shake and pair with a fruit for am meal  2.    Aim to use the rower 2x/week to increase upper body strength  3.    Continue to be mindful about food choices to maximize nutrition when you are hungry/eating a meal.    Follow up Plan  Will follow-up x 1 mo

## 2025-07-15 ENCOUNTER — PATIENT OUTREACH (OUTPATIENT)
Dept: CARE COORDINATION | Facility: CLINIC | Age: 40
End: 2025-07-15
Payer: COMMERCIAL

## 2025-07-15 NOTE — PROGRESS NOTES
Called Eloy as there is a need for this provider to reschedule on appointment for August 15. LVM asking if she could switch to Monday, August 11 at 9am and if not to call or email and we can go from there. Emailed her as well. Apologized for the inconvenience.

## 2025-07-18 ENCOUNTER — SPECIALTY PHARMACY (OUTPATIENT)
Dept: PHARMACY | Facility: CLINIC | Age: 40
End: 2025-07-18

## 2025-07-18 PROCEDURE — RXMED WILLOW AMBULATORY MEDICATION CHARGE

## 2025-07-21 ENCOUNTER — PHARMACY VISIT (OUTPATIENT)
Dept: PHARMACY | Facility: CLINIC | Age: 40
End: 2025-07-21
Payer: COMMERCIAL

## 2025-08-11 ENCOUNTER — APPOINTMENT (OUTPATIENT)
Dept: PRIMARY CARE | Facility: CLINIC | Age: 40
End: 2025-08-11
Payer: COMMERCIAL

## 2025-08-11 ENCOUNTER — SPECIALTY PHARMACY (OUTPATIENT)
Dept: PHARMACY | Facility: CLINIC | Age: 40
End: 2025-08-11

## 2025-08-11 PROCEDURE — RXMED WILLOW AMBULATORY MEDICATION CHARGE

## 2025-08-11 ASSESSMENT — PATIENT HEALTH QUESTIONNAIRE - PHQ9
2. FEELING DOWN, DEPRESSED OR HOPELESS: NOT AT ALL
SUM OF ALL RESPONSES TO PHQ9 QUESTIONS 1 AND 2: 0
1. LITTLE INTEREST OR PLEASURE IN DOING THINGS: NOT AT ALL

## 2025-08-13 ENCOUNTER — PHARMACY VISIT (OUTPATIENT)
Dept: PHARMACY | Facility: CLINIC | Age: 40
End: 2025-08-13
Payer: COMMERCIAL

## 2025-08-14 ENCOUNTER — PATIENT MESSAGE (OUTPATIENT)
Dept: PRIMARY CARE | Facility: CLINIC | Age: 40
End: 2025-08-14
Payer: COMMERCIAL

## 2025-08-15 ENCOUNTER — APPOINTMENT (OUTPATIENT)
Dept: CARE COORDINATION | Facility: CLINIC | Age: 40
End: 2025-08-15
Payer: COMMERCIAL

## 2025-09-04 ENCOUNTER — SPECIALTY PHARMACY (OUTPATIENT)
Dept: PHARMACY | Facility: CLINIC | Age: 40
End: 2025-09-04

## 2025-09-19 ENCOUNTER — APPOINTMENT (OUTPATIENT)
Dept: CARE COORDINATION | Facility: CLINIC | Age: 40
End: 2025-09-19
Payer: COMMERCIAL

## 2025-10-06 ENCOUNTER — APPOINTMENT (OUTPATIENT)
Dept: PRIMARY CARE | Facility: CLINIC | Age: 40
End: 2025-10-06
Payer: COMMERCIAL

## 2025-11-03 ENCOUNTER — APPOINTMENT (OUTPATIENT)
Dept: PRIMARY CARE | Facility: CLINIC | Age: 40
End: 2025-11-03
Payer: COMMERCIAL